# Patient Record
Sex: FEMALE | Race: WHITE | NOT HISPANIC OR LATINO | URBAN - METROPOLITAN AREA
[De-identification: names, ages, dates, MRNs, and addresses within clinical notes are randomized per-mention and may not be internally consistent; named-entity substitution may affect disease eponyms.]

---

## 2020-03-10 ENCOUNTER — INPATIENT (INPATIENT)
Facility: HOSPITAL | Age: 70
LOS: 0 days | Discharge: HOME | DRG: 914 | End: 2020-03-10
Attending: INTERNAL MEDICINE | Admitting: INTERNAL MEDICINE
Payer: COMMERCIAL

## 2020-03-10 VITALS
SYSTOLIC BLOOD PRESSURE: 137 MMHG | OXYGEN SATURATION: 97 % | DIASTOLIC BLOOD PRESSURE: 87 MMHG | WEIGHT: 110.01 LBS | HEART RATE: 80 BPM | RESPIRATION RATE: 18 BRPM | HEIGHT: 65 IN | TEMPERATURE: 99 F

## 2020-03-10 VITALS
DIASTOLIC BLOOD PRESSURE: 87 MMHG | OXYGEN SATURATION: 100 % | SYSTOLIC BLOOD PRESSURE: 161 MMHG | HEART RATE: 76 BPM | TEMPERATURE: 99 F | RESPIRATION RATE: 18 BRPM

## 2020-03-10 DIAGNOSIS — Z90.710 ACQUIRED ABSENCE OF BOTH CERVIX AND UTERUS: Chronic | ICD-10-CM

## 2020-03-10 DIAGNOSIS — Z69.81 ENCOUNTER FOR MENTAL HEALTH SERVICES FOR VICTIM OF OTHER ABUSE: ICD-10-CM

## 2020-03-10 DIAGNOSIS — S09.90XA UNSPECIFIED INJURY OF HEAD, INITIAL ENCOUNTER: ICD-10-CM

## 2020-03-10 LAB
ALBUMIN SERPL ELPH-MCNC: 4.8 G/DL — SIGNIFICANT CHANGE UP (ref 3.3–5)
ALP SERPL-CCNC: 68 U/L — SIGNIFICANT CHANGE UP (ref 40–120)
ALT FLD-CCNC: 20 U/L — SIGNIFICANT CHANGE UP (ref 10–45)
ANION GAP SERPL CALC-SCNC: 14 MMOL/L — SIGNIFICANT CHANGE UP (ref 5–17)
APAP SERPL-MCNC: <15 UG/ML — SIGNIFICANT CHANGE UP (ref 10–30)
APPEARANCE UR: CLEAR — SIGNIFICANT CHANGE UP
AST SERPL-CCNC: 22 U/L — SIGNIFICANT CHANGE UP (ref 10–40)
BASOPHILS # BLD AUTO: 0.03 K/UL — SIGNIFICANT CHANGE UP (ref 0–0.2)
BASOPHILS NFR BLD AUTO: 0.3 % — SIGNIFICANT CHANGE UP (ref 0–2)
BILIRUB SERPL-MCNC: 1.4 MG/DL — HIGH (ref 0.2–1.2)
BILIRUB UR-MCNC: NEGATIVE — SIGNIFICANT CHANGE UP
BUN SERPL-MCNC: 17 MG/DL — SIGNIFICANT CHANGE UP (ref 7–23)
CALCIUM SERPL-MCNC: 9.7 MG/DL — SIGNIFICANT CHANGE UP (ref 8.4–10.5)
CHLORIDE SERPL-SCNC: 102 MMOL/L — SIGNIFICANT CHANGE UP (ref 96–108)
CO2 SERPL-SCNC: 23 MMOL/L — SIGNIFICANT CHANGE UP (ref 22–31)
COLOR SPEC: SIGNIFICANT CHANGE UP
CREAT SERPL-MCNC: 0.72 MG/DL — SIGNIFICANT CHANGE UP (ref 0.5–1.3)
DIFF PNL FLD: NEGATIVE — SIGNIFICANT CHANGE UP
EOSINOPHIL # BLD AUTO: 0.01 K/UL — SIGNIFICANT CHANGE UP (ref 0–0.5)
EOSINOPHIL NFR BLD AUTO: 0.1 % — SIGNIFICANT CHANGE UP (ref 0–6)
ETHANOL SERPL-MCNC: SIGNIFICANT CHANGE UP MG/DL (ref 0–10)
GLUCOSE SERPL-MCNC: 117 MG/DL — HIGH (ref 70–99)
GLUCOSE UR QL: NEGATIVE — SIGNIFICANT CHANGE UP
HCT VFR BLD CALC: 39.3 % — SIGNIFICANT CHANGE UP (ref 34.5–45)
HGB BLD-MCNC: 12.9 G/DL — SIGNIFICANT CHANGE UP (ref 11.5–15.5)
IMM GRANULOCYTES NFR BLD AUTO: 0.2 % — SIGNIFICANT CHANGE UP (ref 0–1.5)
KETONES UR-MCNC: ABNORMAL
LEUKOCYTE ESTERASE UR-ACNC: NEGATIVE — SIGNIFICANT CHANGE UP
LYMPHOCYTES # BLD AUTO: 1.37 K/UL — SIGNIFICANT CHANGE UP (ref 1–3.3)
LYMPHOCYTES # BLD AUTO: 12 % — LOW (ref 13–44)
MCHC RBC-ENTMCNC: 30.4 PG — SIGNIFICANT CHANGE UP (ref 27–34)
MCHC RBC-ENTMCNC: 32.8 GM/DL — SIGNIFICANT CHANGE UP (ref 32–36)
MCV RBC AUTO: 92.7 FL — SIGNIFICANT CHANGE UP (ref 80–100)
MONOCYTES # BLD AUTO: 0.64 K/UL — SIGNIFICANT CHANGE UP (ref 0–0.9)
MONOCYTES NFR BLD AUTO: 5.6 % — SIGNIFICANT CHANGE UP (ref 2–14)
NEUTROPHILS # BLD AUTO: 9.37 K/UL — HIGH (ref 1.8–7.4)
NEUTROPHILS NFR BLD AUTO: 81.8 % — HIGH (ref 43–77)
NITRITE UR-MCNC: NEGATIVE — SIGNIFICANT CHANGE UP
NRBC # BLD: 0 /100 WBCS — SIGNIFICANT CHANGE UP (ref 0–0)
PCP SPEC-MCNC: SIGNIFICANT CHANGE UP
PH UR: 7.5 — SIGNIFICANT CHANGE UP (ref 5–8)
PLATELET # BLD AUTO: 305 K/UL — SIGNIFICANT CHANGE UP (ref 150–400)
POTASSIUM SERPL-MCNC: 4.1 MMOL/L — SIGNIFICANT CHANGE UP (ref 3.5–5.3)
POTASSIUM SERPL-SCNC: 4.1 MMOL/L — SIGNIFICANT CHANGE UP (ref 3.5–5.3)
PROT SERPL-MCNC: 7.6 G/DL — SIGNIFICANT CHANGE UP (ref 6–8.3)
PROT UR-MCNC: ABNORMAL
RBC # BLD: 4.24 M/UL — SIGNIFICANT CHANGE UP (ref 3.8–5.2)
RBC # FLD: 13.2 % — SIGNIFICANT CHANGE UP (ref 10.3–14.5)
SALICYLATES SERPL-MCNC: <2 MG/DL — LOW (ref 15–30)
SODIUM SERPL-SCNC: 139 MMOL/L — SIGNIFICANT CHANGE UP (ref 135–145)
SP GR SPEC: 1.02 — SIGNIFICANT CHANGE UP (ref 1.01–1.02)
UROBILINOGEN FLD QL: NEGATIVE — SIGNIFICANT CHANGE UP
WBC # BLD: 11.44 K/UL — HIGH (ref 3.8–10.5)
WBC # FLD AUTO: 11.44 K/UL — HIGH (ref 3.8–10.5)

## 2020-03-10 PROCEDURE — 73140 X-RAY EXAM OF FINGER(S): CPT

## 2020-03-10 PROCEDURE — 70450 CT HEAD/BRAIN W/O DYE: CPT

## 2020-03-10 PROCEDURE — 70486 CT MAXILLOFACIAL W/O DYE: CPT | Mod: 26

## 2020-03-10 PROCEDURE — 36415 COLL VENOUS BLD VENIPUNCTURE: CPT

## 2020-03-10 PROCEDURE — 99285 EMERGENCY DEPT VISIT HI MDM: CPT | Mod: 25

## 2020-03-10 PROCEDURE — 81001 URINALYSIS AUTO W/SCOPE: CPT

## 2020-03-10 PROCEDURE — 90471 IMMUNIZATION ADMIN: CPT

## 2020-03-10 PROCEDURE — 99223 1ST HOSP IP/OBS HIGH 75: CPT

## 2020-03-10 PROCEDURE — 76377 3D RENDER W/INTRP POSTPROCES: CPT

## 2020-03-10 PROCEDURE — 80053 COMPREHEN METABOLIC PANEL: CPT

## 2020-03-10 PROCEDURE — 12011 RPR F/E/E/N/L/M 2.5 CM/<: CPT

## 2020-03-10 PROCEDURE — 85027 COMPLETE CBC AUTOMATED: CPT

## 2020-03-10 PROCEDURE — 70486 CT MAXILLOFACIAL W/O DYE: CPT

## 2020-03-10 PROCEDURE — 70450 CT HEAD/BRAIN W/O DYE: CPT | Mod: 26

## 2020-03-10 PROCEDURE — 73140 X-RAY EXAM OF FINGER(S): CPT | Mod: 26,LT

## 2020-03-10 PROCEDURE — 76377 3D RENDER W/INTRP POSTPROCES: CPT | Mod: 26

## 2020-03-10 PROCEDURE — 90715 TDAP VACCINE 7 YRS/> IM: CPT

## 2020-03-10 PROCEDURE — 80307 DRUG TEST PRSMV CHEM ANLYZR: CPT

## 2020-03-10 RX ORDER — TETANUS TOXOID, REDUCED DIPHTHERIA TOXOID AND ACELLULAR PERTUSSIS VACCINE, ADSORBED 5; 2.5; 8; 8; 2.5 [IU]/.5ML; [IU]/.5ML; UG/.5ML; UG/.5ML; UG/.5ML
0.5 SUSPENSION INTRAMUSCULAR ONCE
Refills: 0 | Status: COMPLETED | OUTPATIENT
Start: 2020-03-10 | End: 2020-03-10

## 2020-03-10 RX ADMIN — TETANUS TOXOID, REDUCED DIPHTHERIA TOXOID AND ACELLULAR PERTUSSIS VACCINE, ADSORBED 0.5 MILLILITER(S): 5; 2.5; 8; 8; 2.5 SUSPENSION INTRAMUSCULAR at 18:20
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
## 2020-03-10 NOTE — ED PROVIDER NOTE - EYES NEGATIVE STATEMENT, MLM
Recommendations (Free Text): Recommended a zyrtec 10 mg over the counter every day if this is a bothersome problem for him.  There is no cure. Recommendation Preamble: The following details were discussed: Detail Level: Zone no discharge, no irritation, no pain, no redness, and no visual changes.

## 2020-03-10 NOTE — DISCHARGE NOTE PROVIDER - NSDCCPCAREPLAN_GEN_ALL_CORE_FT
PRINCIPAL DISCHARGE DIAGNOSIS  Diagnosis: Closed head injury, initial encounter  Assessment and Plan of Treatment:       SECONDARY DISCHARGE DIAGNOSES  Diagnosis: Facial laceration, initial encounter  Assessment and Plan of Treatment:
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
## 2020-03-10 NOTE — H&P ADULT - PROBLEM SELECTOR PLAN 1
head CT not reported to demonstrate acute hemorrhage  neuro examination does not demonstrate paralysis and patient is alert and oriented to name, location and time

## 2020-03-10 NOTE — H&P ADULT - ATTENDING COMMENTS
Patient assigned to me by night hospitalist in charge for management and care for patient for this evening only. Care to be resumed by day hospitalist at 08:00 in the morning and thereafter.     Eric Fuller MD  Department of Hospital Medicine  pager: [phone] (available from 20:00 to 08:00)

## 2020-03-10 NOTE — ED PROCEDURE NOTE - PROCEDURE ADDITIONAL DETAILS
2 deep dermal sutures with 4-0 vicryl     running skin suture with 6-0 prolene performed. 1 simple interrupted skin suture at most medial aspect of wound 6-0 prolene.
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
## 2020-03-10 NOTE — ED PROVIDER NOTE - ATTENDING CONTRIBUTION TO CARE
Attending MD Landry:  I personally have seen and examined this patient.  Resident note reviewed and agree on plan of care and except where noted.  See HPI, PE, and MDM for details.

## 2020-03-10 NOTE — H&P ADULT - NSHPREVIEWOFSYSTEMS_GEN_ALL_CORE
CONSTITUTIONAL: No fever, no chills  EYES: No eye pain, no acute blindness  Mouth: no pain in mouth, no cuts  RESPIRATORY: No cough, No sob  CARDIOVASCULAR: No CP, no palpitations  GASTROINTESTINAL: no abdominal pain, no n/v/d  GENITOURINARY: No dysuria, no hematuria  Heme: No easy bruising, no swelling appreciated in neck  NEUROLOGICAL: No seizure, No paralysis  SKIN: No itching, wound over right eye which is now sutured  MUSCULOSKELETAL: mild joint pain in b/l hand, able to make fist with right hand
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
## 2020-03-10 NOTE — ED ADULT NURSE REASSESSMENT NOTE - NS ED NURSE REASSESS COMMENT FT1
Report received from ARSENIO Quinn. Patient placed on 1:1 constant observation for patient safety per MD orders with staff at bedside. Security was called - All belongings removed. Patient wanded by security. 1:1 in place and directly observed at all times. Patient currently comfortable and safe. Comfort and safety provided.
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
## 2020-03-10 NOTE — ED PROCEDURE NOTE - ATTENDING CONTRIBUTION TO CARE
Attending MD Landry: Risks, benefit and alternatives of procedure explained to patient and patient demonstrated verbal understanding and consent.  I was present during the key portions of the procedure.

## 2020-03-10 NOTE — ED PROVIDER NOTE - PROGRESS NOTE DETAILS
Santi: Patient with history of alzheimer's, not oriented and attempted to walk out of ED.  Patient placed on 1:1 for elopement risk Attending MD Landry: cannot establish safe dispo home for patient, requires admission for safety evaluation.

## 2020-03-10 NOTE — DISCHARGE NOTE PROVIDER - CARE PROVIDER_API CALL
Ifeanyi Johnston)  Internal Medicine  [address] [facility], [address]  [place] [address]  Phone: [phone]  Fax: [phone]  Follow Up Time:
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
## 2020-03-10 NOTE — DISCHARGE NOTE NURSING/CASE MANAGEMENT/SOCIAL WORK - PATIENT PORTAL LINK FT
You can access the FollowMyHealth Patient Portal offered by [facility] by registering at the following website: [email or web address]. By joining [facility]’s FollowMyHealth portal, you will also be able to view your health information using other applications (apps) compatible with our system.

## 2020-03-10 NOTE — ED PROVIDER NOTE - PHYSICAL EXAMINATION
Attending MD Landry:    Gen: awake and alert, oriented x 3, GCS 15   Neck: supple, no swelling, trachea midline, no midline spinal ttp. ~2cm partial thickness laceration to right superior orbital ridge, no bony facial ttp, EOMI b/l, PERRL b/l  CV: heart with reg rhythm, no obvious murmur appreciated   Resp: CTAB, breathing comfortably  Abd: soft, NT, ND  Extremities: extremities warm to the touch, no peripheral edema   Msk: no extremity deformities. +ttp about left 4th dorsal DIP, full extension and flexion of the digit, sensation intact throughout digit.   Pysch: appropriate affect    Neuro: moves all extremities spontaneously, no gross motor or sensory deficits
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
## 2020-03-11 ENCOUNTER — INPATIENT (INPATIENT)
Facility: HOSPITAL | Age: 70
LOS: 4 days | Discharge: HOME | DRG: 884 | End: 2020-03-16
Attending: INTERNAL MEDICINE | Admitting: INTERNAL MEDICINE
Payer: COMMERCIAL

## 2020-03-11 VITALS
TEMPERATURE: 98 F | DIASTOLIC BLOOD PRESSURE: 87 MMHG | RESPIRATION RATE: 18 BRPM | OXYGEN SATURATION: 98 % | HEART RATE: 88 BPM | SYSTOLIC BLOOD PRESSURE: 151 MMHG

## 2020-03-11 DIAGNOSIS — R41.0 DISORIENTATION, UNSPECIFIED: ICD-10-CM

## 2020-03-11 DIAGNOSIS — S01.81XA LACERATION WITHOUT FOREIGN BODY OF OTHER PART OF HEAD, INITIAL ENCOUNTER: ICD-10-CM

## 2020-03-11 DIAGNOSIS — Z02.9 ENCOUNTER FOR ADMINISTRATIVE EXAMINATIONS, UNSPECIFIED: ICD-10-CM

## 2020-03-11 DIAGNOSIS — S09.90XA UNSPECIFIED INJURY OF HEAD, INITIAL ENCOUNTER: ICD-10-CM

## 2020-03-11 DIAGNOSIS — F03.91 UNSPECIFIED DEMENTIA WITH BEHAVIORAL DISTURBANCE: ICD-10-CM

## 2020-03-11 DIAGNOSIS — G30.0 ALZHEIMER'S DISEASE WITH EARLY ONSET: ICD-10-CM

## 2020-03-11 DIAGNOSIS — Z90.710 ACQUIRED ABSENCE OF BOTH CERVIX AND UTERUS: Chronic | ICD-10-CM

## 2020-03-11 LAB
ALBUMIN SERPL ELPH-MCNC: 3.9 G/DL — SIGNIFICANT CHANGE UP (ref 3.3–5)
ALBUMIN SERPL ELPH-MCNC: 4.3 G/DL — SIGNIFICANT CHANGE UP (ref 3.3–5)
ALP SERPL-CCNC: 51 U/L — SIGNIFICANT CHANGE UP (ref 40–120)
ALP SERPL-CCNC: 56 U/L — SIGNIFICANT CHANGE UP (ref 40–120)
ALT FLD-CCNC: 15 U/L — SIGNIFICANT CHANGE UP (ref 10–45)
ALT FLD-CCNC: 15 U/L — SIGNIFICANT CHANGE UP (ref 10–45)
ANION GAP SERPL CALC-SCNC: 12 MMOL/L — SIGNIFICANT CHANGE UP (ref 5–17)
ANION GAP SERPL CALC-SCNC: 14 MMOL/L — SIGNIFICANT CHANGE UP (ref 5–17)
AST SERPL-CCNC: 23 U/L — SIGNIFICANT CHANGE UP (ref 10–40)
AST SERPL-CCNC: 28 U/L — SIGNIFICANT CHANGE UP (ref 10–40)
BASOPHILS # BLD AUTO: 0.02 K/UL — SIGNIFICANT CHANGE UP (ref 0–0.2)
BASOPHILS NFR BLD AUTO: 0.2 % — SIGNIFICANT CHANGE UP (ref 0–2)
BILIRUB DIRECT SERPL-MCNC: 0.3 MG/DL — HIGH (ref 0–0.2)
BILIRUB INDIRECT FLD-MCNC: 1.9 MG/DL — HIGH (ref 0.2–1)
BILIRUB SERPL-MCNC: 2 MG/DL — HIGH (ref 0.2–1.2)
BILIRUB SERPL-MCNC: 2.2 MG/DL — HIGH (ref 0.2–1.2)
BUN SERPL-MCNC: 12 MG/DL — SIGNIFICANT CHANGE UP (ref 7–23)
BUN SERPL-MCNC: 13 MG/DL — SIGNIFICANT CHANGE UP (ref 7–23)
CALCIUM SERPL-MCNC: 9.1 MG/DL — SIGNIFICANT CHANGE UP (ref 8.4–10.5)
CALCIUM SERPL-MCNC: 9.5 MG/DL — SIGNIFICANT CHANGE UP (ref 8.4–10.5)
CHLORIDE SERPL-SCNC: 101 MMOL/L — SIGNIFICANT CHANGE UP (ref 96–108)
CHLORIDE SERPL-SCNC: 101 MMOL/L — SIGNIFICANT CHANGE UP (ref 96–108)
CO2 SERPL-SCNC: 25 MMOL/L — SIGNIFICANT CHANGE UP (ref 22–31)
CO2 SERPL-SCNC: 25 MMOL/L — SIGNIFICANT CHANGE UP (ref 22–31)
CREAT SERPL-MCNC: 0.67 MG/DL — SIGNIFICANT CHANGE UP (ref 0.5–1.3)
CREAT SERPL-MCNC: 0.74 MG/DL — SIGNIFICANT CHANGE UP (ref 0.5–1.3)
EOSINOPHIL # BLD AUTO: 0.01 K/UL — SIGNIFICANT CHANGE UP (ref 0–0.5)
EOSINOPHIL NFR BLD AUTO: 0.1 % — SIGNIFICANT CHANGE UP (ref 0–6)
GLUCOSE SERPL-MCNC: 115 MG/DL — HIGH (ref 70–99)
GLUCOSE SERPL-MCNC: 121 MG/DL — HIGH (ref 70–99)
HCT VFR BLD CALC: 34.1 % — LOW (ref 34.5–45)
HGB BLD-MCNC: 11.4 G/DL — LOW (ref 11.5–15.5)
IMM GRANULOCYTES NFR BLD AUTO: 0.4 % — SIGNIFICANT CHANGE UP (ref 0–1.5)
LYMPHOCYTES # BLD AUTO: 0.91 K/UL — LOW (ref 1–3.3)
LYMPHOCYTES # BLD AUTO: 9.5 % — LOW (ref 13–44)
MAGNESIUM SERPL-MCNC: 2.2 MG/DL — SIGNIFICANT CHANGE UP (ref 1.6–2.6)
MCHC RBC-ENTMCNC: 30.6 PG — SIGNIFICANT CHANGE UP (ref 27–34)
MCHC RBC-ENTMCNC: 33.4 GM/DL — SIGNIFICANT CHANGE UP (ref 32–36)
MCV RBC AUTO: 91.4 FL — SIGNIFICANT CHANGE UP (ref 80–100)
MONOCYTES # BLD AUTO: 0.77 K/UL — SIGNIFICANT CHANGE UP (ref 0–0.9)
MONOCYTES NFR BLD AUTO: 8 % — SIGNIFICANT CHANGE UP (ref 2–14)
NEUTROPHILS # BLD AUTO: 7.83 K/UL — HIGH (ref 1.8–7.4)
NEUTROPHILS NFR BLD AUTO: 81.8 % — HIGH (ref 43–77)
NRBC # BLD: 0 /100 WBCS — SIGNIFICANT CHANGE UP (ref 0–0)
PHOSPHATE SERPL-MCNC: 3.4 MG/DL — SIGNIFICANT CHANGE UP (ref 2.5–4.5)
PLATELET # BLD AUTO: 258 K/UL — SIGNIFICANT CHANGE UP (ref 150–400)
POTASSIUM SERPL-MCNC: 3.7 MMOL/L — SIGNIFICANT CHANGE UP (ref 3.5–5.3)
POTASSIUM SERPL-MCNC: 3.9 MMOL/L — SIGNIFICANT CHANGE UP (ref 3.5–5.3)
POTASSIUM SERPL-SCNC: 3.7 MMOL/L — SIGNIFICANT CHANGE UP (ref 3.5–5.3)
POTASSIUM SERPL-SCNC: 3.9 MMOL/L — SIGNIFICANT CHANGE UP (ref 3.5–5.3)
PROT SERPL-MCNC: 5.9 G/DL — LOW (ref 6–8.3)
PROT SERPL-MCNC: 6.7 G/DL — SIGNIFICANT CHANGE UP (ref 6–8.3)
RBC # BLD: 3.73 M/UL — LOW (ref 3.8–5.2)
RBC # FLD: 13 % — SIGNIFICANT CHANGE UP (ref 10.3–14.5)
SODIUM SERPL-SCNC: 138 MMOL/L — SIGNIFICANT CHANGE UP (ref 135–145)
SODIUM SERPL-SCNC: 140 MMOL/L — SIGNIFICANT CHANGE UP (ref 135–145)
WBC # BLD: 9.58 K/UL — SIGNIFICANT CHANGE UP (ref 3.8–10.5)
WBC # FLD AUTO: 9.58 K/UL — SIGNIFICANT CHANGE UP (ref 3.8–10.5)

## 2020-03-11 PROCEDURE — 99223 1ST HOSP IP/OBS HIGH 75: CPT | Mod: GC

## 2020-03-11 PROCEDURE — 71045 X-RAY EXAM CHEST 1 VIEW: CPT | Mod: 26

## 2020-03-11 PROCEDURE — 99285 EMERGENCY DEPT VISIT HI MDM: CPT

## 2020-03-11 PROCEDURE — 93010 ELECTROCARDIOGRAM REPORT: CPT

## 2020-03-11 RX ORDER — SODIUM CHLORIDE 9 MG/ML
1000 INJECTION INTRAMUSCULAR; INTRAVENOUS; SUBCUTANEOUS
Refills: 0 | Status: DISCONTINUED | OUTPATIENT
Start: 2020-03-11 | End: 2020-03-16

## 2020-03-11 RX ORDER — FOLIC ACID 0.8 MG
1 TABLET ORAL DAILY
Refills: 0 | Status: DISCONTINUED | OUTPATIENT
Start: 2020-03-11 | End: 2020-03-16

## 2020-03-11 RX ORDER — HALOPERIDOL DECANOATE 100 MG/ML
5 INJECTION INTRAMUSCULAR ONCE
Refills: 0 | Status: COMPLETED | OUTPATIENT
Start: 2020-03-11 | End: 2020-03-11

## 2020-03-11 RX ORDER — LANOLIN ALCOHOL/MO/W.PET/CERES
5 CREAM (GRAM) TOPICAL AT BEDTIME
Refills: 0 | Status: DISCONTINUED | OUTPATIENT
Start: 2020-03-11 | End: 2020-03-16

## 2020-03-11 RX ORDER — INFLUENZA VIRUS VACCINE 15; 15; 15; 15 UG/.5ML; UG/.5ML; UG/.5ML; UG/.5ML
0.5 SUSPENSION INTRAMUSCULAR ONCE
Refills: 0 | Status: COMPLETED | OUTPATIENT
Start: 2020-03-11 | End: 2020-03-11

## 2020-03-11 RX ORDER — THIAMINE MONONITRATE (VIT B1) 100 MG
100 TABLET ORAL DAILY
Refills: 0 | Status: COMPLETED | OUTPATIENT
Start: 2020-03-11 | End: 2020-03-14

## 2020-03-11 RX ADMIN — HALOPERIDOL DECANOATE 5 MILLIGRAM(S): 100 INJECTION INTRAMUSCULAR at 11:15

## 2020-03-11 RX ADMIN — SODIUM CHLORIDE 75 MILLILITER(S): 9 INJECTION INTRAMUSCULAR; INTRAVENOUS; SUBCUTANEOUS at 19:54

## 2020-03-11 RX ADMIN — Medication 100 MILLIGRAM(S): at 21:20

## 2020-03-11 RX ADMIN — Medication 2 MILLIGRAM(S): at 11:15

## 2020-03-11 RX ADMIN — Medication 1 MILLIGRAM(S): at 21:20

## 2020-03-11 RX ADMIN — Medication 1 TABLET(S): at 21:21

## 2020-03-11 NOTE — ED BEHAVIORAL HEALTH ASSESSMENT NOTE - DESCRIPTION
Pt received haldol 5mg and Ativan 2mg Dementia Lives with  in private residence Pt received haldol 5mg and Ativan 2mg    T(C): 36.5 (03-11-20 @ 15:16), Max: 37.4 (03-10-20 @ 17:10)  HR: 68 (03-11-20 @ 15:16) (68 - 88)  BP: 103/70 (03-11-20 @ 15:16) (100/60 - 161/87)  RR: 16 (03-11-20 @ 15:16) (16 - 18)  SpO2: 99% (03-11-20 @ 15:16) (97% - 100%)  Wt(kg): --
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
## 2020-03-11 NOTE — ED ADULT NURSE REASSESSMENT NOTE - NS ED NURSE REASSESS COMMENT FT1
Patient became very anxious and wanted to leave Able to redirect and encouraged patient to wait in am for sw

## 2020-03-11 NOTE — H&P ADULT - NSHPLABSRESULTS_GEN_ALL_CORE
LABS:                        11.4   9.58  )-----------( 258      ( 11 Mar 2020 12:24 )             34.1     03-11    138  |  101  |  13  ----------------------------<  115<H>  3.7   |  25  |  0.67    Ca    9.5      11 Mar 2020 12:24    TPro  6.7  /  Alb  4.3  /  TBili  2.0<H>  /  DBili  x   /  AST  23  /  ALT  15  /  AlkPhos  56  03-11      CAPILLARY BLOOD GLUCOSE            Urinalysis Basic - ( 10 Mar 2020 20:05 )    Color: Light Yellow / Appearance: Clear / S.024 / pH: x  Gluc: x / Ketone: Small  / Bili: Negative / Urobili: Negative   Blood: x / Protein: 30 mg/dL / Nitrite: Negative   Leuk Esterase: Negative / RBC: 4 /hpf / WBC 4 /HPF   Sq Epi: x / Non Sq Epi: 2 /hpf / Bacteria: Negative        RADIOLOGY & ADDITIONAL TESTS:
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
## 2020-03-11 NOTE — ED BEHAVIORAL HEALTH ASSESSMENT NOTE - ADDITIONAL DETAILS / COMMENTS
Able to spell THINK forwards and backwards, Recall 1/3 "apple", cued recall 2/3 "katelyn" for type of coin.
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
## 2020-03-11 NOTE — ED BEHAVIORAL HEALTH ASSESSMENT NOTE - DIFFERENTIAL
Dementia (recorded as Alzheimer's type in EMR)  Substance induced mood disorder less likely given negative UTOX, though report of alcohol use
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
## 2020-03-11 NOTE — ED BEHAVIORAL HEALTH ASSESSMENT NOTE - PSYCHIATRIC ISSUES AND PLAN (INCLUDE STANDING AND PRN MEDICATION)
prn Haldol 2mg q6hr prn for agitation, prn Haldol 2mg q6hr prn for agitation, melatonin 3mg QHS prn Haldol 2mg PO/IV q6hr prn for agitation if QTc<500, melatonin 3mg QHS
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
## 2020-03-11 NOTE — ED BEHAVIORAL HEALTH ASSESSMENT NOTE - SUICIDE PROTECTIVE FACTORS
Identifies reasons for living/Supportive social network of family or friends/Fear of death or the actual act of killing self/Beloved pets

## 2020-03-11 NOTE — ED PROVIDER NOTE - PHYSICAL EXAMINATION
Gen: AAOx3, non-toxic  Head: NCAT  HEENT: EOMI, oral mucosa moist, normal conjunctiva, R periorbital ecchymosis with sutured laceration  Lung: CTAB, no respiratory distress, no wheezes/rhonchi/rales B/L, speaking in full sentences  CV: RRR, no murmurs, rubs or gallops  Abd: soft, NTND, no guarding  MSK: no visible deformities  Neuro: No focal sensory or motor deficits  Skin: Warm, well perfused, no rash  Psych: normal affect.   ~Dorothy Breaux M.D. Resident
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
## 2020-03-11 NOTE — ED BEHAVIORAL HEALTH NOTE - BEHAVIORAL HEALTH NOTE
Pt received Ativan 2 mg with Haldol 5 mg IM at 1115H for increasing agitation. Pt attempted to elope several times. Pt was very irritable and agitated, refused to listen to staff regarding reasons why she has to be admitted.  Pt was then transferred. to room CC25. Pt is now resting. VS taken (see flowsheet)

## 2020-03-11 NOTE — H&P ADULT - ASSESSMENT
69 f PMH of newly dx dementia( ?alzheimers)  on Aricept , Alcohol use   aw behavioral disturbances,      #Dementia with behavioral disturbances.    psych cs appreciated   Recs:   1. Pt does not have capacity to leave AMA or participate in d/c planning.    2. C/w home psych meds; will need to f/u with daughter regarding potential use of neuroleptics.    3. CIWA with PRN Ativan.  Thiamine/MVI/folate.    4. PRN: Haldol 2mg PO/IV q6h PRN agitation, monitor EKG fo qtc<500    [facility]  [phone]
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
## 2020-03-11 NOTE — ED PROVIDER NOTE - NS ED ROS FT
GENERAL: No fever or chills, EYES: no change in vision, HEENT: no trouble swallowing or speaking, CARDIAC: no chest pain, PULMONARY: no cough or SOB, GI: no abdominal pain, no nausea, no vomiting, no diarrhea or constipation, : No changes in urination, SKIN: no rashes, NEURO: no headache,  MSK: No joint pain ~Dorothy Breaux M.D. Resident
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
## 2020-03-12 LAB
ANION GAP SERPL CALC-SCNC: 11 MMOL/L — SIGNIFICANT CHANGE UP (ref 5–17)
APPEARANCE UR: CLEAR — SIGNIFICANT CHANGE UP
BACTERIA # UR AUTO: ABNORMAL
BASOPHILS # BLD AUTO: 0.02 K/UL — SIGNIFICANT CHANGE UP (ref 0–0.2)
BASOPHILS NFR BLD AUTO: 0.3 % — SIGNIFICANT CHANGE UP (ref 0–2)
BILIRUB UR-MCNC: NEGATIVE — SIGNIFICANT CHANGE UP
BUN SERPL-MCNC: 12 MG/DL — SIGNIFICANT CHANGE UP (ref 7–23)
CALCIUM SERPL-MCNC: 8.6 MG/DL — SIGNIFICANT CHANGE UP (ref 8.4–10.5)
CHLORIDE SERPL-SCNC: 105 MMOL/L — SIGNIFICANT CHANGE UP (ref 96–108)
CO2 SERPL-SCNC: 24 MMOL/L — SIGNIFICANT CHANGE UP (ref 22–31)
COLOR SPEC: YELLOW — SIGNIFICANT CHANGE UP
CREAT SERPL-MCNC: 0.71 MG/DL — SIGNIFICANT CHANGE UP (ref 0.5–1.3)
DIFF PNL FLD: NEGATIVE — SIGNIFICANT CHANGE UP
EOSINOPHIL # BLD AUTO: 0.08 K/UL — SIGNIFICANT CHANGE UP (ref 0–0.5)
EOSINOPHIL NFR BLD AUTO: 1.3 % — SIGNIFICANT CHANGE UP (ref 0–6)
EPI CELLS # UR: 2 /HPF — SIGNIFICANT CHANGE UP
GLUCOSE SERPL-MCNC: 90 MG/DL — SIGNIFICANT CHANGE UP (ref 70–99)
GLUCOSE UR QL: NEGATIVE — SIGNIFICANT CHANGE UP
HCT VFR BLD CALC: 34.4 % — LOW (ref 34.5–45)
HCV AB S/CO SERPL IA: 0.18 S/CO — SIGNIFICANT CHANGE UP (ref 0–0.99)
HCV AB SERPL-IMP: SIGNIFICANT CHANGE UP
HGB BLD-MCNC: 10.9 G/DL — LOW (ref 11.5–15.5)
HYALINE CASTS # UR AUTO: 4 /LPF — HIGH (ref 0–2)
IMM GRANULOCYTES NFR BLD AUTO: 0.3 % — SIGNIFICANT CHANGE UP (ref 0–1.5)
KETONES UR-MCNC: SIGNIFICANT CHANGE UP
LEUKOCYTE ESTERASE UR-ACNC: ABNORMAL
LYMPHOCYTES # BLD AUTO: 1.92 K/UL — SIGNIFICANT CHANGE UP (ref 1–3.3)
LYMPHOCYTES # BLD AUTO: 30.5 % — SIGNIFICANT CHANGE UP (ref 13–44)
MAGNESIUM SERPL-MCNC: 2.2 MG/DL — SIGNIFICANT CHANGE UP (ref 1.6–2.6)
MCHC RBC-ENTMCNC: 29.7 PG — SIGNIFICANT CHANGE UP (ref 27–34)
MCHC RBC-ENTMCNC: 31.7 GM/DL — LOW (ref 32–36)
MCV RBC AUTO: 93.7 FL — SIGNIFICANT CHANGE UP (ref 80–100)
MONOCYTES # BLD AUTO: 0.66 K/UL — SIGNIFICANT CHANGE UP (ref 0–0.9)
MONOCYTES NFR BLD AUTO: 10.5 % — SIGNIFICANT CHANGE UP (ref 2–14)
NEUTROPHILS # BLD AUTO: 3.59 K/UL — SIGNIFICANT CHANGE UP (ref 1.8–7.4)
NEUTROPHILS NFR BLD AUTO: 57.1 % — SIGNIFICANT CHANGE UP (ref 43–77)
NITRITE UR-MCNC: NEGATIVE — SIGNIFICANT CHANGE UP
NRBC # BLD: 0 /100 WBCS — SIGNIFICANT CHANGE UP (ref 0–0)
PH UR: 6 — SIGNIFICANT CHANGE UP (ref 5–8)
PHOSPHATE SERPL-MCNC: 3.8 MG/DL — SIGNIFICANT CHANGE UP (ref 2.5–4.5)
PLATELET # BLD AUTO: 243 K/UL — SIGNIFICANT CHANGE UP (ref 150–400)
POTASSIUM SERPL-MCNC: 3.8 MMOL/L — SIGNIFICANT CHANGE UP (ref 3.5–5.3)
POTASSIUM SERPL-SCNC: 3.8 MMOL/L — SIGNIFICANT CHANGE UP (ref 3.5–5.3)
PROT UR-MCNC: SIGNIFICANT CHANGE UP
RBC # BLD: 3.67 M/UL — LOW (ref 3.8–5.2)
RBC # FLD: 13 % — SIGNIFICANT CHANGE UP (ref 10.3–14.5)
RBC CASTS # UR COMP ASSIST: 1 /HPF — SIGNIFICANT CHANGE UP (ref 0–4)
SODIUM SERPL-SCNC: 140 MMOL/L — SIGNIFICANT CHANGE UP (ref 135–145)
SP GR SPEC: 1.01 — SIGNIFICANT CHANGE UP (ref 1.01–1.02)
UROBILINOGEN FLD QL: NEGATIVE — SIGNIFICANT CHANGE UP
WBC # BLD: 6.29 K/UL — SIGNIFICANT CHANGE UP (ref 3.8–10.5)
WBC # FLD AUTO: 6.29 K/UL — SIGNIFICANT CHANGE UP (ref 3.8–10.5)
WBC UR QL: 45 /HPF — HIGH (ref 0–5)

## 2020-03-12 RX ORDER — SODIUM CHLORIDE 9 MG/ML
250 INJECTION INTRAMUSCULAR; INTRAVENOUS; SUBCUTANEOUS ONCE
Refills: 0 | Status: COMPLETED | OUTPATIENT
Start: 2020-03-12 | End: 2020-03-12

## 2020-03-12 RX ADMIN — Medication 1 MILLIGRAM(S): at 11:05

## 2020-03-12 RX ADMIN — SODIUM CHLORIDE 500 MILLILITER(S): 9 INJECTION INTRAMUSCULAR; INTRAVENOUS; SUBCUTANEOUS at 06:27

## 2020-03-12 RX ADMIN — Medication 5 MILLIGRAM(S): at 22:01

## 2020-03-12 RX ADMIN — Medication 1 TABLET(S): at 11:05

## 2020-03-12 RX ADMIN — Medication 100 MILLIGRAM(S): at 11:05

## 2020-03-12 RX ADMIN — Medication 1 MILLIGRAM(S): at 18:30

## 2020-03-12 NOTE — PROGRESS NOTE ADULT - SUBJECTIVE AND OBJECTIVE BOX
Patient is a 69y old  Female who presents with a chief complaint of     INTERVAL HPI/OVERNIGHT EVENTS: noted, feels well  calm and cooperative      Vital Signs Last 24 Hrs  T(C): 36.8 (12 Mar 2020 12:02), Max: 37.3 (11 Mar 2020 19:37)  T(F): 98.2 (12 Mar 2020 12:02), Max: 99.2 (11 Mar 2020 19:37)  HR: 63 (12 Mar 2020 12:02) (63 - 78)  BP: 108/65 (12 Mar 2020 12:02) (91/54 - 109/65)  BP(mean): --  RR: 18 (12 Mar 2020 12:02) (16 - 18)  SpO2: 99% (12 Mar 2020 12:02) (96% - 99%)    folic acid 1 milliGRAM(s) Oral daily  influenza   Vaccine 0.5 milliLiter(s) IntraMuscular once  LORazepam     Tablet 1 milliGRAM(s) Oral every 2 hours PRN  melatonin 5 milliGRAM(s) Oral at bedtime  multivitamin 1 Tablet(s) Oral daily  sodium chloride 0.9%. 1000 milliLiter(s) IV Continuous <Continuous>  thiamine 100 milliGRAM(s) Oral daily      PHYSICAL EXAM:  GENERAL: NAD,   EYES: conjunctiva and sclera clear, rt facial echymosis  ENMT: Moist mucous membranes  NECK: Supple, No JVD, Normal thyroid  NERVOUS SYSTEM:  Alert & Oriented X,   CHEST/LUNG: Clear to auscultation bilaterally; No rales, rhonchi, wheezing, or rubs  HEART: Regular rate and rhythm; No murmurs, rubs, or gallops  ABDOMEN: Soft, Nontender, Nondistended; Bowel sounds present  EXTREMITIES:  2+ Peripheral Pulses, No clubbing, cyanosis, or edema  LYMPH: No lymphadenopathy noted  SKIN: No rashes or lesions    Consultant(s) Notes Reviewed:  [x ] YES  [ ] NO  Care Discussed with Consultants/Other Providers [ x] YES  [ ] NO    LABS:                        10.9   6.29  )-----------( 243      ( 12 Mar 2020 05:44 )             34.4     03-12    140  |  105  |  12  ----------------------------<  90  3.8   |  24  |  0.71    Ca    8.6      12 Mar 2020 05:44  Phos  3.8     03-12  Mg     2.2     03-12    TPro  5.9<L>  /  Alb  3.9  /  TBili  2.2<H>  /  DBili  0.3<H>  /  AST  28  /  ALT  15  /  AlkPhos  51  03-11      Urinalysis Basic - ( 12 Mar 2020 05:44 )    Color: Yellow / Appearance: Clear / S.015 / pH: x  Gluc: x / Ketone: Trace  / Bili: Negative / Urobili: Negative   Blood: x / Protein: Trace / Nitrite: Negative   Leuk Esterase: Large / RBC: 1 /hpf / WBC 45 /HPF   Sq Epi: x / Non Sq Epi: 2 /hpf / Bacteria: Few      CAPILLARY BLOOD GLUCOSE            Urinalysis Basic - ( 12 Mar 2020 05:44 )    Color: Yellow / Appearance: Clear / S.015 / pH: x  Gluc: x / Ketone: Trace  / Bili: Negative / Urobili: Negative   Blood: x / Protein: Trace / Nitrite: Negative   Leuk Esterase: Large / RBC: 1 /hpf / WBC 45 /HPF   Sq Epi: x / Non Sq Epi: 2 /hpf / Bacteria: Few          RADIOLOGY & ADDITIONAL TESTS:    Imaging Personally Reviewed:  [x ] YES  [ ] NO

## 2020-03-12 NOTE — PROVIDER CONTACT NOTE (OTHER) - REASON
pt very anxious at this time - states they will walk out if they are not discharged - CIWA score an 8

## 2020-03-12 NOTE — PROGRESS NOTE ADULT - ASSESSMENT
69 f PMH of newly dx dementia( ?alzheimers)  on Aricept , Alcohol use   aw behavioral disturbances,      #Dementia with behavioral disturbances.    psych cs appreciated , fu recs from today  Recs:   1. Pt does not have capacity to leave AMA or participate in d/c planning.    2. C/w home psych meds; will need to f/u with daughter regarding potential use of neuroleptics.    3. CIWA with PRN Ativan.  Thiamine/MVI/folate.    4. PRN: Haldol 2mg PO/IV q6h PRN agitation, monitor EKG fo qtc<500    [facility]

## 2020-03-12 NOTE — PROVIDER CONTACT NOTE (OTHER) - ASSESSMENT
Pt started on low risk CIWA protocol q 4 hrs. PRN ativan order unclear stating 1 mg ativan oral every two hours. PRN for CIWA score increase by two points and "a total score of 7 or less"

## 2020-03-13 ENCOUNTER — TRANSCRIPTION ENCOUNTER (OUTPATIENT)
Age: 70
End: 2020-03-13

## 2020-03-13 LAB
ANION GAP SERPL CALC-SCNC: 12 MMOL/L — SIGNIFICANT CHANGE UP (ref 5–17)
BUN SERPL-MCNC: 12 MG/DL — SIGNIFICANT CHANGE UP (ref 7–23)
CALCIUM SERPL-MCNC: 9.1 MG/DL — SIGNIFICANT CHANGE UP (ref 8.4–10.5)
CHLORIDE SERPL-SCNC: 104 MMOL/L — SIGNIFICANT CHANGE UP (ref 96–108)
CO2 SERPL-SCNC: 25 MMOL/L — SIGNIFICANT CHANGE UP (ref 22–31)
CREAT SERPL-MCNC: 0.74 MG/DL — SIGNIFICANT CHANGE UP (ref 0.5–1.3)
GLUCOSE SERPL-MCNC: 96 MG/DL — SIGNIFICANT CHANGE UP (ref 70–99)
HCT VFR BLD CALC: 33.4 % — LOW (ref 34.5–45)
HGB BLD-MCNC: 10.7 G/DL — LOW (ref 11.5–15.5)
MCHC RBC-ENTMCNC: 29.7 PG — SIGNIFICANT CHANGE UP (ref 27–34)
MCHC RBC-ENTMCNC: 32 GM/DL — SIGNIFICANT CHANGE UP (ref 32–36)
MCV RBC AUTO: 92.8 FL — SIGNIFICANT CHANGE UP (ref 80–100)
NRBC # BLD: 0 /100 WBCS — SIGNIFICANT CHANGE UP (ref 0–0)
PLATELET # BLD AUTO: 242 K/UL — SIGNIFICANT CHANGE UP (ref 150–400)
POTASSIUM SERPL-MCNC: 3.7 MMOL/L — SIGNIFICANT CHANGE UP (ref 3.5–5.3)
POTASSIUM SERPL-SCNC: 3.7 MMOL/L — SIGNIFICANT CHANGE UP (ref 3.5–5.3)
RBC # BLD: 3.6 M/UL — LOW (ref 3.8–5.2)
RBC # FLD: 12.7 % — SIGNIFICANT CHANGE UP (ref 10.3–14.5)
SODIUM SERPL-SCNC: 141 MMOL/L — SIGNIFICANT CHANGE UP (ref 135–145)
WBC # BLD: 5.5 K/UL — SIGNIFICANT CHANGE UP (ref 3.8–10.5)
WBC # FLD AUTO: 5.5 K/UL — SIGNIFICANT CHANGE UP (ref 3.8–10.5)

## 2020-03-13 PROCEDURE — 99231 SBSQ HOSP IP/OBS SF/LOW 25: CPT | Mod: GC

## 2020-03-13 RX ORDER — DONEPEZIL HYDROCHLORIDE 10 MG/1
5 TABLET, FILM COATED ORAL AT BEDTIME
Refills: 0 | Status: DISCONTINUED | OUTPATIENT
Start: 2020-03-13 | End: 2020-03-16

## 2020-03-13 RX ADMIN — DONEPEZIL HYDROCHLORIDE 5 MILLIGRAM(S): 10 TABLET, FILM COATED ORAL at 18:06

## 2020-03-13 RX ADMIN — Medication 100 MILLIGRAM(S): at 12:16

## 2020-03-13 RX ADMIN — Medication 1 MILLIGRAM(S): at 12:16

## 2020-03-13 RX ADMIN — Medication 1 TABLET(S): at 12:16

## 2020-03-13 RX ADMIN — Medication 1 MILLIGRAM(S): at 18:09

## 2020-03-13 RX ADMIN — Medication 5 MILLIGRAM(S): at 21:58

## 2020-03-13 NOTE — DISCHARGE NOTE PROVIDER - NSFOLLOWUPCLINICS_GEN_ALL_ED_FT
See op note   [facility]  Behavioral Health  [address] [place] [address]  Phone: [phone]  Fax:   Follow Up Time:
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
## 2020-03-13 NOTE — DISCHARGE NOTE PROVIDER - NSDCCPCAREPLAN_GEN_ALL_CORE_FT
PRINCIPAL DISCHARGE DIAGNOSIS  Diagnosis: Confusion  Assessment and Plan of Treatment: Started on CIWA protocol  Seen by behavorial health  Continue aricept  Social work planning safe discharge PRINCIPAL DISCHARGE DIAGNOSIS  Diagnosis: Confusion  Assessment and Plan of Treatment: Started on CIWA protocol  Seen by behavorial health - lacks capacity to make own decisions.   Continue aricept  Social work involved for safe discharge. Family to provide safe home environment and continued care.  Follow up with your Primary MD Dr Johnston within 5 days.
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
## 2020-03-13 NOTE — DISCHARGE NOTE PROVIDER - CARE PROVIDER_API CALL
Ifeanyi Johnston)  Internal Medicine  [address], [address]  [place] [address]  Phone: [phone]  Fax: [phone]  Follow Up Time: 1 week

## 2020-03-13 NOTE — PROGRESS NOTE BEHAVIORAL HEALTH - NSBHCHARTREVIEWINVESTIGATE_PSY_A_CORE FT
< from: 12 Lead ECG (03.11.20 @ 19:29) >      Ventricular Rate 78 BPM    Atrial Rate 78 BPM    P-R Interval 174 ms    QRS Duration 72 ms    Q-T Interval 400 ms    QTC Calculation(Bezet) 456 ms    P Axis 80 degrees    R Axis 80 degrees    T Axis 65 degrees    < end of copied text >

## 2020-03-13 NOTE — PROGRESS NOTE BEHAVIORAL HEALTH - NSBHCONSULTFOLLOWAFTERCARE_PSY_A_CORE FT
[facility] clinic [phone] OP psych f/u at [facility]- [phone]  [facility] Crisis clinic- [phone]    [facility] for safe d/c planning
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
## 2020-03-13 NOTE — PROGRESS NOTE BEHAVIORAL HEALTH - NSBHCHARTREVIEWVS_PSY_A_CORE FT
Vital Signs Last 24 Hrs  T(C): 36.7 (13 Mar 2020 11:06), Max: 37.3 (12 Mar 2020 16:00)  T(F): 98 (13 Mar 2020 11:06), Max: 99.1 (12 Mar 2020 16:00)  HR: 71 (13 Mar 2020 11:06) (61 - 78)  BP: 151/81 (13 Mar 2020 11:06) (90/52 - 151/81)  BP(mean): --  RR: 18 (13 Mar 2020 11:06) (18 - 18)  SpO2: 97% (13 Mar 2020 11:06) (95% - 99%)
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
## 2020-03-13 NOTE — DISCHARGE NOTE PROVIDER - HOSPITAL COURSE
69 f PMH of newly dx dementia, on Aricept ,  history of Alcohol use, presents with     ,AMS likely 2/2 EtOH abuse. Patient was started on CIWA protocol and behavorial health was consulted.    Determined by psych that patient does not have capacity to participate in discharge planning or to leave AMA.    Continue aricept for dementia. 69 f PMH of newly dx dementia, on Aricept ,  history of Alcohol use, presents with     ,AMS likely 2/2 EtOH abuse. Patient was started on CIWA protocol and behavorial health was consulted.    Determined by psych that patient does not have capacity to participate in discharge planning or to leave AMA.    Continue aricept for dementia.         Required 1:1 supervision for safety. Followed by SW. Discharge home with  and family.         Stable for discharge with F/U with PMD Dr Ifeanyi Johnston within 1 week.

## 2020-03-13 NOTE — DISCHARGE NOTE PROVIDER - NSDCMRMEDTOKEN_GEN_ALL_CORE_FT
donepezil 5 mg oral tablet: 1 tab(s) orally once a day (at bedtime)  folic acid 1 mg oral tablet: 1 tab(s) orally once a day  melatonin 5 mg oral tablet: 1 tab(s) orally once a day (at bedtime)  Multiple Vitamins oral tablet: 1 tab(s) orally once a day
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
## 2020-03-13 NOTE — DISCHARGE NOTE PROVIDER - NSDCFUADDINST_GEN_ALL_CORE_FT
Follow up with Psychiatry as outpatient  with [facility]- [phone]  [facility] clinic- [phone]
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
## 2020-03-13 NOTE — PROGRESS NOTE BEHAVIORAL HEALTH - NSBHCHARTREVIEWLAB_PSY_A_CORE FT
10.7   5.50  )-----------( 242      ( 13 Mar 2020 06:40 )             33.4     03-13    141  |  104  |  12  ----------------------------<  96  3.7   |  25  |  0.74    Ca    9.1      13 Mar 2020 06:40  Phos  3.8     03-12  Mg     2.2     03-12    TPro  5.9<L>  /  Alb  3.9  /  TBili  2.2<H>  /  DBili  0.3<H>  /  AST  28  /  ALT  15  /  AlkPhos  51  03-11    Urinalysis Basic - ( 12 Mar 2020 05:44 )    Color: Yellow / Appearance: Clear / S.015 / pH: x  Gluc: x / Ketone: Trace  / Bili: Negative / Urobili: Negative   Blood: x / Protein: Trace / Nitrite: Negative   Leuk Esterase: Large / RBC: 1 /hpf / WBC 45 /HPF   Sq Epi: x / Non Sq Epi: 2 /hpf / Bacteria: Few

## 2020-03-13 NOTE — CHART NOTE - NSCHARTNOTEFT_GEN_A_CORE
PA Medicine Event Note    Called by RN stating patient agitated/anxious, attempting to leave floor. CIWA score of 8.  Patient seen at bedside: States social work told her she can "leave today."  Confirmed with Dr. Marie that patient has no capacity to leave AMA today.    Discussed with social work on call who discussed primary  [place] of above.  Per social work, it is unsafe to be discharged at this time. Social work discussed with behavorial health today  confirming patient has no capacity.   Patient A&Ox3, however unable to comprehend need for safe discharge.  IV ativan ordered; however patient agreed to take PO prn ativan for CIWA protocol.  Will continue to monitor patient and endorse to night team.    Leanna Watkins PA-C  Dept of Medicine  #[id number] [facility] Medicine Event Note    Called by RN stating patient agitated/anxious, attempting to leave floor. CIWA score of 8.  Patient seen at bedside: States social work told her she can "leave today."  Confirmed with Dr. Marie that patient has no capacity to leave AMA today.    Discussed with social work on call who discussed primary  [place] of above.  Per social work, it is unsafe to be discharged at this time. Social work discussed with behavorial health today confirming patient has no capacity.   Patient A&Ox3, however unable to comprehend need for safe discharge.  IV ativan ordered; however patient agreed to take PO prn ativan for CIWA protocol.  Will continue to monitor patient and endorse to night team.    Leanna Watkins PA-C  Dept of Medicine  #[id number]

## 2020-03-13 NOTE — PROGRESS NOTE BEHAVIORAL HEALTH - NSBHCHARTREVIEWIMAGING_PSY_A_CORE FT
< from: CT Head No Cont (03.10.20 @ 18:47) >    IMPRESSION:  Head CT: No evidence for intracranial hemorrhage, mass effect, or displaced calvarial fracture.     Maxillofacial CT: No acutemaxillofacial bone fracture.    < end of copied text >

## 2020-03-13 NOTE — PROGRESS NOTE BEHAVIORAL HEALTH - OTHER
Ecchymosis on R brow was sitting during interview unable to assess as pt declined to continue with cognitive assessment portion of interview when  called vague at times
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
## 2020-03-13 NOTE — PROGRESS NOTE BEHAVIORAL HEALTH - NSBHCONSULTMEDS_PSY_A_CORE FT
PRN: Haldol 2mg PO/IV q6hr prn for agitation if QTc<500    melatonin 3mg QHS    CIWA monitoring with sx-triggered Ativan PRN

## 2020-03-14 LAB
ANION GAP SERPL CALC-SCNC: 12 MMOL/L — SIGNIFICANT CHANGE UP (ref 5–17)
BUN SERPL-MCNC: 21 MG/DL — SIGNIFICANT CHANGE UP (ref 7–23)
CALCIUM SERPL-MCNC: 9.5 MG/DL — SIGNIFICANT CHANGE UP (ref 8.4–10.5)
CHLORIDE SERPL-SCNC: 104 MMOL/L — SIGNIFICANT CHANGE UP (ref 96–108)
CO2 SERPL-SCNC: 24 MMOL/L — SIGNIFICANT CHANGE UP (ref 22–31)
CREAT SERPL-MCNC: 0.77 MG/DL — SIGNIFICANT CHANGE UP (ref 0.5–1.3)
GLUCOSE SERPL-MCNC: 101 MG/DL — HIGH (ref 70–99)
HCT VFR BLD CALC: 36.4 % — SIGNIFICANT CHANGE UP (ref 34.5–45)
HGB BLD-MCNC: 11.9 G/DL — SIGNIFICANT CHANGE UP (ref 11.5–15.5)
MAGNESIUM SERPL-MCNC: 2.2 MG/DL — SIGNIFICANT CHANGE UP (ref 1.6–2.6)
MCHC RBC-ENTMCNC: 30 PG — SIGNIFICANT CHANGE UP (ref 27–34)
MCHC RBC-ENTMCNC: 32.7 GM/DL — SIGNIFICANT CHANGE UP (ref 32–36)
MCV RBC AUTO: 91.7 FL — SIGNIFICANT CHANGE UP (ref 80–100)
NRBC # BLD: 0 /100 WBCS — SIGNIFICANT CHANGE UP (ref 0–0)
PHOSPHATE SERPL-MCNC: 4.3 MG/DL — SIGNIFICANT CHANGE UP (ref 2.5–4.5)
PLATELET # BLD AUTO: 261 K/UL — SIGNIFICANT CHANGE UP (ref 150–400)
POTASSIUM SERPL-MCNC: 4.1 MMOL/L — SIGNIFICANT CHANGE UP (ref 3.5–5.3)
POTASSIUM SERPL-SCNC: 4.1 MMOL/L — SIGNIFICANT CHANGE UP (ref 3.5–5.3)
RBC # BLD: 3.97 M/UL — SIGNIFICANT CHANGE UP (ref 3.8–5.2)
RBC # FLD: 12.7 % — SIGNIFICANT CHANGE UP (ref 10.3–14.5)
SODIUM SERPL-SCNC: 140 MMOL/L — SIGNIFICANT CHANGE UP (ref 135–145)
WBC # BLD: 6.81 K/UL — SIGNIFICANT CHANGE UP (ref 3.8–10.5)
WBC # FLD AUTO: 6.81 K/UL — SIGNIFICANT CHANGE UP (ref 3.8–10.5)

## 2020-03-14 RX ORDER — FOLIC ACID 0.8 MG
1 TABLET ORAL
Qty: 0 | Refills: 0 | DISCHARGE
Start: 2020-03-14

## 2020-03-14 RX ORDER — LANOLIN ALCOHOL/MO/W.PET/CERES
1 CREAM (GRAM) TOPICAL
Qty: 0 | Refills: 0 | DISCHARGE
Start: 2020-03-14

## 2020-03-14 RX ORDER — DONEPEZIL HYDROCHLORIDE 10 MG/1
1 TABLET, FILM COATED ORAL
Qty: 0 | Refills: 0 | DISCHARGE
Start: 2020-03-14

## 2020-03-14 RX ADMIN — Medication 1 TABLET(S): at 12:12

## 2020-03-14 RX ADMIN — Medication 1 MILLIGRAM(S): at 16:19

## 2020-03-14 RX ADMIN — Medication 1 MILLIGRAM(S): at 12:12

## 2020-03-14 RX ADMIN — DONEPEZIL HYDROCHLORIDE 5 MILLIGRAM(S): 10 TABLET, FILM COATED ORAL at 10:39

## 2020-03-14 RX ADMIN — Medication 100 MILLIGRAM(S): at 12:12

## 2020-03-14 NOTE — CHART NOTE - NSCHARTNOTEFT_GEN_A_CORE
Pt's son Laith Monterroso  is requesting Pt  to be Opted out and requesting that his father Buddy Monterroso cannot Visit Pt in Hospital he stated " If he enters this Unit you should be calling security "   Staff aware   will c/w  Constant  observation   steven albright NP-C [id number]

## 2020-03-14 NOTE — PROGRESS NOTE ADULT - ATTENDING COMMENTS
psych deemed pt has no capacity for dc plan  dw SW at length-who spoke to dgtr and son- they were ok with pt going home now  will need to clarify safe dc plan    [facility]

## 2020-03-14 NOTE — CHART NOTE - NSCHARTNOTEFT_GEN_A_CORE
Pt wanting to go Home , stating " they told me that I could be discharged today "  Spoke with  psych  attending  DR Alvarez , Pt do not have capacity for AMA or safe discharge planning   After extensive discussions  with LISBET Codne   and DR Blanton  Pt is  not be cleared for discharge today .  Pt restless and anxious , walking around the unit ,  became very upset  with provider   when  told that she cannot be discharged ,   Code flight called  , Pt brought back to floor escorted by  security personnel and  Nursing Adm   Ativan 1 mg  IV P  x 1  stat   c/w Constant observation   fall risk , Bed alarm and chair  alarm   steven albright [id number]
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
## 2020-03-14 NOTE — PROGRESS NOTE ADULT - SUBJECTIVE AND OBJECTIVE BOX
Patient is a 69y old  Female who presents with a chief complaint of     INTERVAL HPI/OVERNIGHT EVENTS: noted, feels well  anxious to leave      Vital Signs Last 24 Hrs  T(C): 36.7 (14 Mar 2020 13:12), Max: 37.1 (14 Mar 2020 04:00)  T(F): 98.1 (14 Mar 2020 13:12), Max: 98.7 (14 Mar 2020 04:00)  HR: 88 (14 Mar 2020 13:12) (61 - 93)  BP: 122/80 (14 Mar 2020 13:12) (90/52 - 156/82)  BP(mean): --  RR: 20 (14 Mar 2020 13:12) (18 - 20)  SpO2: 98% (14 Mar 2020 13:12) (94% - 98%)    donepezil 5 milliGRAM(s) Oral at bedtime  folic acid 1 milliGRAM(s) Oral daily  influenza   Vaccine 0.5 milliLiter(s) IntraMuscular once  LORazepam     Tablet 1 milliGRAM(s) Oral every 2 hours PRN  LORazepam   Injectable 2 milliGRAM(s) IV Push once  LORazepam   Injectable 1 milliGRAM(s) IV Push once  melatonin 5 milliGRAM(s) Oral at bedtime  multivitamin 1 Tablet(s) Oral daily  sodium chloride 0.9%. 1000 milliLiter(s) IV Continuous <Continuous>      PHYSICAL EXAM:  GENERAL: NAD, facial echymosis healing  EYES: conjunctiva and sclera clear  ENMT: Moist mucous membranes  NECK: Supple, No JVD, Normal thyroid  NERVOUS SYSTEM:  Alert & Oriented X,   CHEST/LUNG: Clear to auscultation bilaterally; No rales, rhonchi, wheezing, or rubs  HEART: Regular rate and rhythm; No murmurs, rubs, or gallops  ABDOMEN: Soft, Nontender, Nondistended; Bowel sounds present  EXTREMITIES:  2+ Peripheral Pulses, No clubbing, cyanosis, or edema  LYMPH: No lymphadenopathy noted  SKIN: No rashes or lesions    Consultant(s) Notes Reviewed:  [x ] YES  [ ] NO  Care Discussed with Consultants/Other Providers [ x] YES  [ ] NO    LABS:                        11.9   6.81  )-----------( 261      ( 14 Mar 2020 06:48 )             36.4     03-14    140  |  104  |  21  ----------------------------<  101<H>  4.1   |  24  |  0.77    Ca    9.5      14 Mar 2020 06:48  Phos  4.3     03-14  Mg     2.2     03-14          CAPILLARY BLOOD GLUCOSE                  RADIOLOGY & ADDITIONAL TESTS:    Imaging Personally Reviewed:  [x ] YES  [ ] NO

## 2020-03-15 RX ADMIN — Medication 1 MILLIGRAM(S): at 11:42

## 2020-03-15 RX ADMIN — DONEPEZIL HYDROCHLORIDE 5 MILLIGRAM(S): 10 TABLET, FILM COATED ORAL at 08:25

## 2020-03-15 RX ADMIN — Medication 1 TABLET(S): at 11:42
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
## 2020-03-15 NOTE — PROGRESS NOTE ADULT - SUBJECTIVE AND OBJECTIVE BOX
Patient is a 69y old  Female who presents with a chief complaint of     INTERVAL HPI/OVERNIGHT EVENTS: noted, pt easily getting emotional today  otherwise no new events/complaints      Vital Signs Last 24 Hrs  T(C): 37 (15 Mar 2020 12:00), Max: 37.1 (14 Mar 2020 23:01)  T(F): 98.6 (15 Mar 2020 12:00), Max: 98.7 (14 Mar 2020 23:01)  HR: 87 (15 Mar 2020 12:00) (63 - 92)  BP: 131/73 (15 Mar 2020 12:00) (96/59 - 145/78)  BP(mean): --  RR: 18 (15 Mar 2020 12:00) (17 - 20)  SpO2: 95% (15 Mar 2020 12:00) (95% - 99%)    donepezil 5 milliGRAM(s) Oral at bedtime  folic acid 1 milliGRAM(s) Oral daily  influenza   Vaccine 0.5 milliLiter(s) IntraMuscular once  LORazepam     Tablet 1 milliGRAM(s) Oral every 2 hours PRN  LORazepam   Injectable 2 milliGRAM(s) IV Push once  melatonin 5 milliGRAM(s) Oral at bedtime  multivitamin 1 Tablet(s) Oral daily  sodium chloride 0.9%. 1000 milliLiter(s) IV Continuous <Continuous>      PHYSICAL EXAM:  GENERAL: NAD,   EYES: conjunctiva and sclera clear  ENMT: Moist mucous membranes  NECK: Supple, No JVD, Normal thyroid  NERVOUS SYSTEM:  Alert & Oriented X,   CHEST/LUNG: Clear to auscultation bilaterally; No rales, rhonchi, wheezing, or rubs  HEART: Regular rate and rhythm; No murmurs, rubs, or gallops  ABDOMEN: Soft, Nontender, Nondistended; Bowel sounds present  EXTREMITIES:  2+ Peripheral Pulses, No clubbing, cyanosis, or edema  LYMPH: No lymphadenopathy noted  SKIN: No rashes or lesions    Consultant(s) Notes Reviewed:  [x ] YES  [ ] NO  Care Discussed with Consultants/Other Providers [ x] YES  [ ] NO    LABS:                        11.9   6.81  )-----------( 261      ( 14 Mar 2020 06:48 )             36.4     03-14    140  |  104  |  21  ----------------------------<  101<H>  4.1   |  24  |  0.77    Ca    9.5      14 Mar 2020 06:48  Phos  4.3     03-14  Mg     2.2     03-14          CAPILLARY BLOOD GLUCOSE                  RADIOLOGY & ADDITIONAL TESTS:    Imaging Personally Reviewed:  [x ] YES  [ ] NO

## 2020-03-15 NOTE — PROGRESS NOTE ADULT - ATTENDING COMMENTS
psych deemed pt has no capacity for dc plan  dw SW at length-who spoke to dgtr and son- they were ok with pt to be dced -  dw son- who made arrangements for mom to stay in [place]  will need to clarify safe dc plan monday    [facility]

## 2020-03-16 ENCOUNTER — TRANSCRIPTION ENCOUNTER (OUTPATIENT)
Age: 70
End: 2020-03-16

## 2020-03-16 VITALS
OXYGEN SATURATION: 97 % | SYSTOLIC BLOOD PRESSURE: 130 MMHG | HEART RATE: 100 BPM | TEMPERATURE: 99 F | RESPIRATION RATE: 18 BRPM | DIASTOLIC BLOOD PRESSURE: 87 MMHG

## 2020-03-16 PROCEDURE — 84100 ASSAY OF PHOSPHORUS: CPT

## 2020-03-16 PROCEDURE — 81001 URINALYSIS AUTO W/SCOPE: CPT

## 2020-03-16 PROCEDURE — 80048 BASIC METABOLIC PNL TOTAL CA: CPT

## 2020-03-16 PROCEDURE — 85027 COMPLETE CBC AUTOMATED: CPT

## 2020-03-16 PROCEDURE — 99285 EMERGENCY DEPT VISIT HI MDM: CPT | Mod: 25

## 2020-03-16 PROCEDURE — 83735 ASSAY OF MAGNESIUM: CPT

## 2020-03-16 PROCEDURE — 80076 HEPATIC FUNCTION PANEL: CPT

## 2020-03-16 PROCEDURE — 93005 ELECTROCARDIOGRAM TRACING: CPT

## 2020-03-16 PROCEDURE — 80053 COMPREHEN METABOLIC PANEL: CPT

## 2020-03-16 PROCEDURE — 96372 THER/PROPH/DIAG INJ SC/IM: CPT

## 2020-03-16 PROCEDURE — 99238 HOSP IP/OBS DSCHRG MGMT 30/<: CPT

## 2020-03-16 PROCEDURE — 71045 X-RAY EXAM CHEST 1 VIEW: CPT

## 2020-03-16 PROCEDURE — 86803 HEPATITIS C AB TEST: CPT

## 2020-03-16 RX ADMIN — Medication 1 MILLIGRAM(S): at 12:11

## 2020-03-16 RX ADMIN — Medication 1 TABLET(S): at 12:11

## 2020-03-16 NOTE — DISCHARGE NOTE NURSING/CASE MANAGEMENT/SOCIAL WORK - PATIENT PORTAL LINK FT
You can access the FollowMyHealth Patient Portal offered by [facility] by registering at the following website: [email or web address]. By joining [facility]’s FollowMyHealth portal, you will also be able to view your health information using other applications (apps) compatible with our system.

## 2021-06-28 PROBLEM — G30.0 ALZHEIMER'S DISEASE WITH EARLY ONSET: Chronic | Status: ACTIVE | Noted: 2020-03-10

## 2021-08-17 NOTE — ED ADULT NURSE NOTE - HOW OFTEN DO YOU HAVE A DRINK CONTAINING ALCOHOL?
Patient urinated in bed, cleaned up and placed on bedpan at this time        Bill Arzate  08/17/21 1958 Never

## 2021-09-20 ENCOUNTER — APPOINTMENT (OUTPATIENT)
Dept: NEUROLOGY | Facility: CLINIC | Age: 71
End: 2021-09-20

## 2021-11-19 ENCOUNTER — INPATIENT (INPATIENT)
Facility: HOSPITAL | Age: 71
LOS: 4 days | Discharge: HOME | End: 2021-11-24
Attending: HOSPITALIST | Admitting: HOSPITALIST
Payer: MEDICARE

## 2021-11-19 VITALS
RESPIRATION RATE: 18 BRPM | SYSTOLIC BLOOD PRESSURE: 136 MMHG | TEMPERATURE: 97 F | OXYGEN SATURATION: 99 % | HEART RATE: 80 BPM | DIASTOLIC BLOOD PRESSURE: 75 MMHG

## 2021-11-19 DIAGNOSIS — F09 UNSPECIFIED MENTAL DISORDER DUE TO KNOWN PHYSIOLOGICAL CONDITION: ICD-10-CM

## 2021-11-19 DIAGNOSIS — Z90.710 ACQUIRED ABSENCE OF BOTH CERVIX AND UTERUS: Chronic | ICD-10-CM

## 2021-11-19 DIAGNOSIS — F03.90 UNSPECIFIED DEMENTIA, UNSPECIFIED SEVERITY, WITHOUT BEHAVIORAL DISTURBANCE, PSYCHOTIC DISTURBANCE, MOOD DISTURBANCE, AND ANXIETY: ICD-10-CM

## 2021-11-19 LAB
ALBUMIN SERPL ELPH-MCNC: 4.8 G/DL — SIGNIFICANT CHANGE UP (ref 3.3–5)
ALP SERPL-CCNC: 93 U/L — SIGNIFICANT CHANGE UP (ref 40–120)
ALT FLD-CCNC: 21 U/L — SIGNIFICANT CHANGE UP (ref 4–33)
ANION GAP SERPL CALC-SCNC: 11 MMOL/L — SIGNIFICANT CHANGE UP (ref 7–14)
AST SERPL-CCNC: 23 U/L — SIGNIFICANT CHANGE UP (ref 4–32)
BASOPHILS # BLD AUTO: 0.05 K/UL — SIGNIFICANT CHANGE UP (ref 0–0.2)
BASOPHILS NFR BLD AUTO: 0.6 % — SIGNIFICANT CHANGE UP (ref 0–2)
BILIRUB SERPL-MCNC: 0.7 MG/DL — SIGNIFICANT CHANGE UP (ref 0.2–1.2)
BUN SERPL-MCNC: 14 MG/DL — SIGNIFICANT CHANGE UP (ref 7–23)
CALCIUM SERPL-MCNC: 9.6 MG/DL — SIGNIFICANT CHANGE UP (ref 8.4–10.5)
CHLORIDE SERPL-SCNC: 101 MMOL/L — SIGNIFICANT CHANGE UP (ref 98–107)
CO2 SERPL-SCNC: 26 MMOL/L — SIGNIFICANT CHANGE UP (ref 22–31)
CREAT SERPL-MCNC: 0.88 MG/DL — SIGNIFICANT CHANGE UP (ref 0.5–1.3)
EOSINOPHIL # BLD AUTO: 0.09 K/UL — SIGNIFICANT CHANGE UP (ref 0–0.5)
EOSINOPHIL NFR BLD AUTO: 1 % — SIGNIFICANT CHANGE UP (ref 0–6)
GLUCOSE SERPL-MCNC: 92 MG/DL — SIGNIFICANT CHANGE UP (ref 70–99)
HCT VFR BLD CALC: 42.4 % — SIGNIFICANT CHANGE UP (ref 34.5–45)
HGB BLD-MCNC: 13.1 G/DL — SIGNIFICANT CHANGE UP (ref 11.5–15.5)
IANC: 5.78 K/UL — SIGNIFICANT CHANGE UP (ref 1.5–8.5)
IMM GRANULOCYTES NFR BLD AUTO: 0.5 % — SIGNIFICANT CHANGE UP (ref 0–1.5)
LYMPHOCYTES # BLD AUTO: 2.18 K/UL — SIGNIFICANT CHANGE UP (ref 1–3.3)
LYMPHOCYTES # BLD AUTO: 24.6 % — SIGNIFICANT CHANGE UP (ref 13–44)
MCHC RBC-ENTMCNC: 29.5 PG — SIGNIFICANT CHANGE UP (ref 27–34)
MCHC RBC-ENTMCNC: 30.9 GM/DL — LOW (ref 32–36)
MCV RBC AUTO: 95.5 FL — SIGNIFICANT CHANGE UP (ref 80–100)
MONOCYTES # BLD AUTO: 0.73 K/UL — SIGNIFICANT CHANGE UP (ref 0–0.9)
MONOCYTES NFR BLD AUTO: 8.2 % — SIGNIFICANT CHANGE UP (ref 2–14)
NEUTROPHILS # BLD AUTO: 5.78 K/UL — SIGNIFICANT CHANGE UP (ref 1.8–7.4)
NEUTROPHILS NFR BLD AUTO: 65.1 % — SIGNIFICANT CHANGE UP (ref 43–77)
NRBC # BLD: 0 /100 WBCS — SIGNIFICANT CHANGE UP
NRBC # FLD: 0 K/UL — SIGNIFICANT CHANGE UP
PLATELET # BLD AUTO: 332 K/UL — SIGNIFICANT CHANGE UP (ref 150–400)
POTASSIUM SERPL-MCNC: 3.9 MMOL/L — SIGNIFICANT CHANGE UP (ref 3.5–5.3)
POTASSIUM SERPL-SCNC: 3.9 MMOL/L — SIGNIFICANT CHANGE UP (ref 3.5–5.3)
PROT SERPL-MCNC: 7.6 G/DL — SIGNIFICANT CHANGE UP (ref 6–8.3)
RBC # BLD: 4.44 M/UL — SIGNIFICANT CHANGE UP (ref 3.8–5.2)
RBC # FLD: 13.6 % — SIGNIFICANT CHANGE UP (ref 10.3–14.5)
SARS-COV-2 RNA SPEC QL NAA+PROBE: SIGNIFICANT CHANGE UP
SODIUM SERPL-SCNC: 138 MMOL/L — SIGNIFICANT CHANGE UP (ref 135–145)
TOXICOLOGY SCREEN, DRUGS OF ABUSE, SERUM RESULT: SIGNIFICANT CHANGE UP
TSH SERPL-MCNC: 1.14 UIU/ML — SIGNIFICANT CHANGE UP (ref 0.27–4.2)
WBC # BLD: 8.87 K/UL — SIGNIFICANT CHANGE UP (ref 3.8–10.5)
WBC # FLD AUTO: 8.87 K/UL — SIGNIFICANT CHANGE UP (ref 3.8–10.5)

## 2021-11-19 PROCEDURE — 93010 ELECTROCARDIOGRAM REPORT: CPT

## 2021-11-19 PROCEDURE — 99285 EMERGENCY DEPT VISIT HI MDM: CPT | Mod: 25

## 2021-11-19 PROCEDURE — 99285 EMERGENCY DEPT VISIT HI MDM: CPT
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
## 2021-11-19 NOTE — ED BEHAVIORAL HEALTH ASSESSMENT NOTE - PREFERRED LANGUAGE
no pneumonia or PE : has pulm nodule which has been stable: > reason for fever: has contaminated positive blood clutres: pts family wants IDc onsult  9/9: seesm to be doing ok : abiotics have been dced: no SOB : no cough  9/10: breathing wise OK: off antibtiocs!  9/11: reslved English
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
## 2021-11-19 NOTE — ED BEHAVIORAL HEALTH ASSESSMENT NOTE - RISK ASSESSMENT
acute risks include dementia and poor social support. chronic risks include cognitive disorder, marital conflict. protective factors include no depressive episode, no jd, no psychosis, no substance abuse, no acces to weapons, no suicide attempt, no suicidal ideation, no homicidal ideation, no violent behavior. LOW SUICIDE risk. Low Acute Suicide Risk

## 2021-11-19 NOTE — ED BEHAVIORAL HEALTH ASSESSMENT NOTE - DESCRIPTION
dementia lives in [place] with  and poodle.  travels [place] every 3 weeks. Poor relationship with 2 children. Former teacher. calm and cooperative  Vital Signs Last 24 Hrs  T(C): 36.8 (19 Nov 2021 19:48), Max: 36.8 (19 Nov 2021 19:48)  T(F): 98.2 (19 Nov 2021 19:48), Max: 98.2 (19 Nov 2021 19:48)  HR: 63 (19 Nov 2021 19:48) (63 - 80)  BP: 176/74 (19 Nov 2021 19:48) (136/75 - 176/74)  BP(mean): --  RR: 19 (19 Nov 2021 19:48) (18 - 19)  SpO2: 100% (19 Nov 2021 19:48) (99% - 100%) dementia.  states pt received covid vaccine x3

## 2021-11-19 NOTE — ED BEHAVIORAL HEALTH ASSESSMENT NOTE - PSYCHIATRIC ISSUES AND PLAN (INCLUDE STANDING AND PRN MEDICATION)
continue Buspar 10mg BID, Donepezil 5mg BID, Memantine 5mg BID, Prozac 10mg daily. PRN Zyprexa 1.25mg PO/IM severe agitation
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
## 2021-11-19 NOTE — ED BEHAVIORAL HEALTH NOTE - BEHAVIORAL HEALTH NOTE
Worker then called patient’s  Mr. Brennan ([phone])  and he states he will handle the affairs with the dog. He also states that the hospital can keep the patient. He states that he has no one that can pick the patient up. He is requesting for the psychiatrist to call him. Worker then called patient’s  Mr. Brennan ([phone])  and he states he will handle the affairs with the dog. He also states that the hospital can keep the patient. He states that he has no one that can pick the patient up. He is requesting for the psychiatrist to call him. sw will follow. Worker then called patient’s  Mr. Monterroso ([phone])  and he states he will handle the affairs with the dog. He also states that the hospital can keep the patient. He states that he has no one that can pick the patient up. He is requesting for the psychiatrist to call him. sw will follow.

## 2021-11-19 NOTE — ED BEHAVIORAL HEALTH ASSESSMENT NOTE - DETAILS
no history suicidality APS referred pt to the ED, see BH note for contact info  Psychiatry [phone] APS
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
## 2021-11-19 NOTE — ED BEHAVIORAL HEALTH ASSESSMENT NOTE - CURRENT MEDICATION
Buspar 10mg BID, Donepezil 5mg BID, Memantine 5mg BID, Prozac 10mg daily, Tobradex eye drops left eye QID (verified with [facility] [phone]).

## 2021-11-19 NOTE — ED BEHAVIORAL HEALTH ASSESSMENT NOTE - SUMMARY
70yo  F, domiciled in [place], retired teacher, no formal psychiatric history, no admissions, no suicide attempts, no substance abuse, no violence/legal issues, PMH dementia, brought in by EMS referred by APS with concerns of wandering.  Patient presents with word finding difficulties and disorientation consistent with dementia. There is no current evidence of depression, jd or psychosis. She is not suicidal, homicidal or violent. There is no acute psychiatric symptoms that would require inpatient psychiatric admission. Patient was referred to anonymously to Adult Protective Services, who met with patient and determined her to be an unsafe discharge due to her cognitive impairment.  is [place] and pt has no nearby family or friends that can stay with her presently. Currently would recommend social admission until additional services can be arranged. PT DOES NOT HAVE CAPACITY TO LEAVE AMA.

## 2021-11-19 NOTE — ED ADULT TRIAGE NOTE - LOCATION:
February 5, 2018      Neymar Horner MD  [address] [place] [address]           [facility] - Neurosurgery 7th Fl  [address]  [facility] [address]  Phone: [phone]          Patient: Courtney Reilly   MR Number: [id number]   YOB: 1962   Date of Visit: 2/5/2018       Dear Dr. Neymar Horner:    Thank you for referring Courtney Reilly to me for evaluation. Attached you will find relevant portions of my assessment and plan of care.    If you have questions, please do not hesitate to call me. I look forward to following Courtney Reilly along with you.    Sincerely,    Bernadette Thompson MD    Enclosure  CC:  No Recipients    If you would like to receive this communication electronically, please contact [email or web address] or [phone] to request more information on [employer] access.    For providers and/or their staff who would like to refer a patient to [facility], please contact us through our one-stop-shop provider referral line, [facility] , at [phone].    If you feel you have received this communication in error or would no longer like to receive these types of communications, please e-mail [email or web address]          Left arm;

## 2021-11-19 NOTE — ED BEHAVIORAL HEALTH NOTE - BEHAVIORAL HEALTH NOTE
Pt to be medically admitted as confirmed with  RN. As per prior SW staff sign out to all collateral parties to be updated on status. Writer contacted APS supervisor, Huong Kurtz, at [phone] to provide update. APS Supervisor aware of pt's admission. Contact information provided.       Writer called pt's , Larry Brennan, at [phone] to also update on pt's status.  was displeased with phone call., requesting reason for admission and then stating "good keep her for the rest of her life".     [place] [facility] #[phone] also notified; writer spoke with on staff police supervisor.

## 2021-11-19 NOTE — ED ADULT NURSE NOTE - CHIEF COMPLAINT QUOTE
Patient brought to ER by EMS from home for psych evaluation. Adult protective services called police for this reoccurring incidents.. Pt is paranoid, driving around neighborhood aimlessly. Non compliance with meds. Pt is considered a risk to herself. Pt has a dog at home. .  Point of contact: APS: Nir Fofana: [phone]/Marsha Grullon: [phone]

## 2021-11-19 NOTE — ED BEHAVIORAL HEALTH ASSESSMENT NOTE - OTHER PAST PSYCHIATRIC HISTORY (INCLUDE DETAILS REGARDING ONSET, COURSE OF ILLNESS, INPATIENT/OUTPATIENT TREATMENT)
no psychiatric history. diagnosed with dementia in 2019 by neurologist Dr. Carlos Fleming at [facility]. put on Buspar 10mg BID and Donepezil 5mg BID, in July 2021 added Memantine 5mg BID, in October 2021 added Prozac 10mg daily.

## 2021-11-19 NOTE — ED ADULT TRIAGE NOTE - CHIEF COMPLAINT QUOTE
Patient brought to ER by EMS from home for psych evaluation. Adult protective services called police for this reoccurring incidents.. Pt is paranoid, driving around neighborhood aimlessly. Non compliance with meds. Pt is considered a risk to herself. Pt has a dog at home. .  Point of contact: APS: Nir Fofana: [phone]/Marsha Grullon: [phone]

## 2021-11-20 DIAGNOSIS — Z29.9 ENCOUNTER FOR PROPHYLACTIC MEASURES, UNSPECIFIED: ICD-10-CM

## 2021-11-20 DIAGNOSIS — F41.9 ANXIETY DISORDER, UNSPECIFIED: ICD-10-CM

## 2021-11-20 DIAGNOSIS — F03.90 UNSPECIFIED DEMENTIA, UNSPECIFIED SEVERITY, WITHOUT BEHAVIORAL DISTURBANCE, PSYCHOTIC DISTURBANCE, MOOD DISTURBANCE, AND ANXIETY: ICD-10-CM

## 2021-11-20 LAB
COVID-19 SPIKE DOMAIN AB INTERP: POSITIVE
COVID-19 SPIKE DOMAIN ANTIBODY RESULT: >250 U/ML — HIGH
SARS-COV-2 IGG+IGM SERPL QL IA: >250 U/ML — HIGH
SARS-COV-2 IGG+IGM SERPL QL IA: POSITIVE

## 2021-11-20 PROCEDURE — 12345: CPT | Mod: NC

## 2021-11-20 PROCEDURE — 99223 1ST HOSP IP/OBS HIGH 75: CPT

## 2021-11-20 RX ORDER — FLUOXETINE HCL 10 MG
10 CAPSULE ORAL DAILY
Refills: 0 | Status: DISCONTINUED | OUTPATIENT
Start: 2021-11-20 | End: 2021-11-24

## 2021-11-20 RX ORDER — DONEPEZIL HYDROCHLORIDE 10 MG/1
5 TABLET, FILM COATED ORAL
Refills: 0 | Status: DISCONTINUED | OUTPATIENT
Start: 2021-11-20 | End: 2021-11-24

## 2021-11-20 RX ORDER — ONDANSETRON 8 MG/1
4 TABLET, FILM COATED ORAL EVERY 8 HOURS
Refills: 0 | Status: DISCONTINUED | OUTPATIENT
Start: 2021-11-20 | End: 2021-11-24

## 2021-11-20 RX ORDER — LANOLIN ALCOHOL/MO/W.PET/CERES
3 CREAM (GRAM) TOPICAL AT BEDTIME
Refills: 0 | Status: DISCONTINUED | OUTPATIENT
Start: 2021-11-20 | End: 2021-11-24

## 2021-11-20 RX ORDER — OLANZAPINE 15 MG/1
1.25 TABLET, FILM COATED ORAL ONCE
Refills: 0 | Status: DISCONTINUED | OUTPATIENT
Start: 2021-11-20 | End: 2021-11-21

## 2021-11-20 RX ORDER — MEMANTINE HYDROCHLORIDE 10 MG/1
5 TABLET ORAL
Refills: 0 | Status: DISCONTINUED | OUTPATIENT
Start: 2021-11-20 | End: 2021-11-24

## 2021-11-20 RX ORDER — TOBRAMYCIN AND DEXAMETHASONE 1; 3 MG/ML; MG/ML
2 SUSPENSION/ DROPS OPHTHALMIC
Qty: 0 | Refills: 0 | DISCHARGE

## 2021-11-20 RX ORDER — OLANZAPINE 15 MG/1
1.25 TABLET, FILM COATED ORAL EVERY 8 HOURS
Refills: 0 | Status: DISCONTINUED | OUTPATIENT
Start: 2021-11-20 | End: 2021-11-21

## 2021-11-20 RX ORDER — ENOXAPARIN SODIUM 100 MG/ML
40 INJECTION SUBCUTANEOUS DAILY
Refills: 0 | Status: DISCONTINUED | OUTPATIENT
Start: 2021-11-20 | End: 2021-11-24

## 2021-11-20 RX ORDER — FLUOXETINE HCL 10 MG
1 CAPSULE ORAL
Qty: 0 | Refills: 0 | DISCHARGE

## 2021-11-20 RX ORDER — DONEPEZIL HYDROCHLORIDE 10 MG/1
1 TABLET, FILM COATED ORAL
Qty: 0 | Refills: 0 | DISCHARGE

## 2021-11-20 RX ORDER — MEMANTINE HYDROCHLORIDE 10 MG/1
1 TABLET ORAL
Qty: 0 | Refills: 0 | DISCHARGE

## 2021-11-20 RX ORDER — ACETAMINOPHEN 500 MG
650 TABLET ORAL EVERY 6 HOURS
Refills: 0 | Status: DISCONTINUED | OUTPATIENT
Start: 2021-11-20 | End: 2021-11-24

## 2021-11-20 RX ADMIN — MEMANTINE HYDROCHLORIDE 5 MILLIGRAM(S): 10 TABLET ORAL at 22:13

## 2021-11-20 RX ADMIN — DONEPEZIL HYDROCHLORIDE 5 MILLIGRAM(S): 10 TABLET, FILM COATED ORAL at 22:13

## 2021-11-20 RX ADMIN — DONEPEZIL HYDROCHLORIDE 5 MILLIGRAM(S): 10 TABLET, FILM COATED ORAL at 10:23

## 2021-11-20 RX ADMIN — Medication 10 MILLIGRAM(S): at 22:13

## 2021-11-20 RX ADMIN — Medication 10 MILLIGRAM(S): at 10:22

## 2021-11-20 RX ADMIN — Medication 10 MILLIGRAM(S): at 11:51

## 2021-11-20 NOTE — H&P ADULT - PROBLEM SELECTOR PLAN 1
Plan per :   Patient presents with word finding difficulties and disorientation consistent with dementia. There is no current evidence of depression, jd or psychosis. She is not suicidal, homicidal or violent. There is no acute psychiatric symptoms that would require inpatient psychiatric admission. Patient was referred to anonymously to Adult Protective Services, who met with patient and determined her to be an unsafe discharge due to her cognitive impairment.  is [place] and pt has no nearby family or friends that can stay with her presently. Currently would recommend social admission until additional services can be arranged. PT DOES NOT HAVE CAPACITY TO LEAVE AMA.  - MSW following  - F/U UA, TSH, VitD25 level,

## 2021-11-20 NOTE — H&P ADULT - NEGATIVE NEUROLOGICAL SYMPTOMS
no transient paralysis/no weakness/no generalized seizures/no syncope/no vertigo/no loss of sensation/no headache

## 2021-11-20 NOTE — H&P ADULT - NSHPLABSRESULTS_GEN_ALL_CORE
- EKG: SR @ 62b/ min, 1* ST AVB, QT/ QTC= 414/ 420.  - COVID PCR: Not detected.  - Serum Tox: Clean                          13.1   8.87  )-----------( 332      ( 19 Nov 2021 17:40 )             42.4   11-19    138  |  101  |  14  ----------------------------<  92  3.9   |  26  |  0.88    Ca    9.6      19 Nov 2021 17:40    TPro  7.6  /  Alb  4.8  /  TBili  0.7  /  DBili  x   /  AST  23  /  ALT  21  /  AlkPhos  93  11-19

## 2021-11-20 NOTE — PROGRESS NOTE ADULT - SUBJECTIVE AND OBJECTIVE BOX
Patient is a 71y old  Female who presents with a chief complaint of Poor recall (20 Nov 2021 01:54)      SUBJECTIVE / OVERNIGHT EVENTS:    Patient seen and examined at bedside. No dyspnea, chest pain. No active SI/HI.    MEDICATIONS  (STANDING):  busPIRone 10 milliGRAM(s) Oral two times a day  donepezil 5 milliGRAM(s) Oral <User Schedule>  enoxaparin Injectable 40 milliGRAM(s) SubCutaneous daily  FLUoxetine 10 milliGRAM(s) Oral daily  memantine 5 milliGRAM(s) Oral two times a day    MEDICATIONS  (PRN):  acetaminophen     Tablet .. 650 milliGRAM(s) Oral every 6 hours PRN Temp greater or equal to 38C (100.4F), Mild Pain (1 - 3)  aluminum hydroxide/magnesium hydroxide/simethicone Suspension 30 milliLiter(s) Oral every 4 hours PRN Dyspepsia  melatonin 3 milliGRAM(s) Oral at bedtime PRN Insomnia  OLANZapine 1.25 milliGRAM(s) Oral every 8 hours PRN agitatrion  OLANZapine Injectable 1.25 milliGRAM(s) IntraMuscular once PRN sever agitation  ondansetron Injectable 4 milliGRAM(s) IV Push every 8 hours PRN Nausea and/or Vomiting      Vital Signs Last 24 Hrs  T(C): 36.8 (20 Nov 2021 13:39), Max: 36.8 (19 Nov 2021 19:48)  T(F): 98.2 (20 Nov 2021 13:39), Max: 98.2 (19 Nov 2021 19:48)  HR: 60 (20 Nov 2021 13:39) (60 - 63)  BP: 110/70 (20 Nov 2021 13:39) (110/70 - 176/74)  BP(mean): --  RR: 16 (20 Nov 2021 13:39) (16 - 19)  SpO2: 98% (20 Nov 2021 13:39) (98% - 100%)  CAPILLARY BLOOD GLUCOSE        I&O's Summary      PHYSICAL EXAM:  Vital Signs Last 24 Hrs  T(C): 36.8 (11-20-21 @ 13:39)  T(F): 98.2 (11-20-21 @ 13:39), Max: 98.2 (11-19-21 @ 19:48)  HR: 60 (11-20-21 @ 13:39) (60 - 63)  BP: 110/70 (11-20-21 @ 13:39)  BP(mean): --  RR: 16 (11-20-21 @ 13:39) (16 - 19)  SpO2: 98% (11-20-21 @ 13:39) (98% - 100%)  Wt(kg): --    Constitutional: NAD, awake and alert  EYES: EOMI  ENT:  Normal Hearing, no tonsillar exudates   Neck: Soft and supple , no thyromegaly   Respiratory: Breath sounds are clear bilaterally, No wheezing, rales or rhonchi  Cardiovascular: S1 and S2, regular rate and rhythm, no Murmurs, gallops or rubs, no JVD,    Gastrointestinal: Bowel Sounds present, soft, nontender, nondistended, no guarding, no rebound  Extremities: No cyanosis or clubbing; warm to touch  Vascular: 2+ peripheral pulses lower ex  Neurological: No focal deficits, CN II-XII intact bilaterally, sensation to light touch intact in all extremities, gait intact. Pupils are equally reactive to light and symmetrical in size.   Musculoskeletal: 5/5 strength b/l upper and lower extremities; no joint swelling.  Skin: No rashes  Psych: no depression or anhedonia, AAOx3  HEME: no bruises, no nose bleeds      LABS:                        13.1   8.87  )-----------( 332      ( 19 Nov 2021 17:40 )             42.4     11-19    138  |  101  |  14  ----------------------------<  92  3.9   |  26  |  0.88    Ca    9.6      19 Nov 2021 17:40    TPro  7.6  /  Alb  4.8  /  TBili  0.7  /  DBili  x   /  AST  23  /  ALT  21  /  AlkPhos  93  11-19              RADIOLOGY & ADDITIONAL TESTS:    Imaging Personally Reviewed:    Consultant(s) Notes Reviewed:      Care Discussed with Consultants/Other Providers:

## 2021-11-20 NOTE — H&P ADULT - ASSESSMENT
71F, history of dementia and anxiety brought to the Ed after wandering into a police station 6:43 am on 11/19.

## 2021-11-20 NOTE — H&P ADULT - NSHPSOCIALHISTORY_GEN_ALL_CORE
.  Lives with her dog.  Denies Nicotine, ETOH, Illicit/ recreational drug use/ history.   Retired .  Last Mammogram: can not recall when: normal.  Last Colonoscopy: can not recall when. Polyps.  Flu Vax: Pt can not recall  COVID Vax Pt says she had " all 3 shots."

## 2021-11-20 NOTE — H&P ADULT - HISTORY OF PRESENT ILLNESS
History provided from the patient, with collateral information supplied from the chart, due to the pt with poor recall of the events that caused her to be in  and ED.      71F, self ambulating, history of dementia, brought in by EMS referred by APS with concerns of wandering into the police station at 6:43 am on 11/19 and was sent to [facility].  The pt can not recall how she came or reason for being in the ED. She says she is agitated and wants to go home. But also difficulty remembering event, names of object and word finding. She is concerned for her dog at home. Per MSW note: called [facility] police station [phone] and spoke to Officer Josefa serrano #[id number] who states that they are aware of the patient’s dog being at the home and they are taking care of the dog. She denies HA, dizziness, falls, SOB, cough, chest pain, palpitation, nausea, vomit, diarrhea, constipation, abdominal pain, dysuria, chills, diaphoresis.       - Patient was referred to anonymously to Adult Protective Services, who met with patient and determined her to be an unsafe discharge due to her cognitive impairment.  is [place] and pt has no nearby family or friends that can stay with her presently.   -  consult and evaluation is in the chart.     Vitals: T max: 98.2 oral, HR = 63b/ min, BP =113/ 53, RR= 16b/ min, SPO2= 100% ra

## 2021-11-20 NOTE — PROGRESS NOTE ADULT - PROBLEM SELECTOR PLAN 1
Plan per :   Patient presents with word finding difficulties and disorientation consistent with dementia. There is no current evidence of depression, jd or psychosis. She is not suicidal, homicidal or violent. There is no acute psychiatric symptoms that would require inpatient psychiatric admission. Patient was referred to anonymously to Adult Protective Services, who met with patient and determined her to be an unsafe discharge due to her cognitive impairment.  is [place] and pt has no nearby family or friends that can stay with her presently. Currently would recommend social admission until additional services can be arranged. PT DOES NOT HAVE CAPACITY TO LEAVE AMA.  - was being managed outpatient at [facility] by Dr. Dr. Carlos Fleming [phone], unable to reach over weekend.  - c/w memantine/aricept  - MSW following  - TSH wnl  - f/u UA

## 2021-11-20 NOTE — H&P ADULT - PROBLEM SELECTOR PLAN 4
VTE with Lovenox 40 mg sub cut daily.  Fall , Aspiration, safety and seizure precautions.  BOYD and LISBET milian.

## 2021-11-20 NOTE — ED ADULT NURSE REASSESSMENT NOTE - NS ED NURSE REASSESS COMMENT FT1
Received report from night RN, pt calm & cooperative denies si/hi/avh presently, pt tolerated breakfast medicated as per MD rx with am meds eval on going.

## 2021-11-20 NOTE — PROGRESS NOTE ADULT - PROBLEM SELECTOR PLAN 2
Continue Buspar and Zyprexa 1.25 po / IM recommended by .  Ordered for Prozac, per  patient should not be taking prozac as worsens symptoms regarding dementia, asked behavioral health for f/u as patient expressing depressive symptoms, no active SI.

## 2021-11-21 LAB
APPEARANCE UR: CLEAR — SIGNIFICANT CHANGE UP
BILIRUB UR-MCNC: NEGATIVE — SIGNIFICANT CHANGE UP
COLOR SPEC: SIGNIFICANT CHANGE UP
DIFF PNL FLD: NEGATIVE — SIGNIFICANT CHANGE UP
GLUCOSE UR QL: NEGATIVE — SIGNIFICANT CHANGE UP
HCV AB S/CO SERPL IA: 0.07 S/CO — SIGNIFICANT CHANGE UP (ref 0–0.99)
HCV AB SERPL-IMP: SIGNIFICANT CHANGE UP
KETONES UR-MCNC: NEGATIVE — SIGNIFICANT CHANGE UP
LEUKOCYTE ESTERASE UR-ACNC: ABNORMAL
NITRITE UR-MCNC: NEGATIVE — SIGNIFICANT CHANGE UP
PH UR: 7.5 — SIGNIFICANT CHANGE UP (ref 5–8)
PROT UR-MCNC: NEGATIVE — SIGNIFICANT CHANGE UP
SP GR SPEC: 1.01 — SIGNIFICANT CHANGE UP (ref 1–1.05)
UROBILINOGEN FLD QL: SIGNIFICANT CHANGE UP

## 2021-11-21 PROCEDURE — 99233 SBSQ HOSP IP/OBS HIGH 50: CPT

## 2021-11-21 RX ORDER — QUETIAPINE FUMARATE 200 MG/1
12.5 TABLET, FILM COATED ORAL EVERY 6 HOURS
Refills: 0 | Status: DISCONTINUED | OUTPATIENT
Start: 2021-11-21 | End: 2021-11-24

## 2021-11-21 RX ORDER — OLANZAPINE 15 MG/1
1.25 TABLET, FILM COATED ORAL EVERY 6 HOURS
Refills: 0 | Status: DISCONTINUED | OUTPATIENT
Start: 2021-11-21 | End: 2021-11-24

## 2021-11-21 RX ADMIN — Medication 10 MILLIGRAM(S): at 17:25

## 2021-11-21 RX ADMIN — MEMANTINE HYDROCHLORIDE 5 MILLIGRAM(S): 10 TABLET ORAL at 05:49

## 2021-11-21 RX ADMIN — MEMANTINE HYDROCHLORIDE 5 MILLIGRAM(S): 10 TABLET ORAL at 17:25

## 2021-11-21 RX ADMIN — ENOXAPARIN SODIUM 40 MILLIGRAM(S): 100 INJECTION SUBCUTANEOUS at 11:33

## 2021-11-21 RX ADMIN — DONEPEZIL HYDROCHLORIDE 5 MILLIGRAM(S): 10 TABLET, FILM COATED ORAL at 20:29

## 2021-11-21 RX ADMIN — QUETIAPINE FUMARATE 12.5 MILLIGRAM(S): 200 TABLET, FILM COATED ORAL at 15:34

## 2021-11-21 RX ADMIN — Medication 10 MILLIGRAM(S): at 05:49

## 2021-11-21 RX ADMIN — Medication 10 MILLIGRAM(S): at 11:33

## 2021-11-21 RX ADMIN — DONEPEZIL HYDROCHLORIDE 5 MILLIGRAM(S): 10 TABLET, FILM COATED ORAL at 08:25

## 2021-11-21 NOTE — PROGRESS NOTE ADULT - PROBLEM SELECTOR PLAN 1
Plan per :   Patient presents with word finding difficulties and disorientation consistent with dementia. There is no current evidence of depression, jd or psychosis. She is not suicidal, homicidal or violent. There is no acute psychiatric symptoms that would require inpatient psychiatric admission. Patient was referred to anonymously to Adult Protective Services, who met with patient and determined her to be an unsafe discharge due to her cognitive impairment.  is [place] and pt has no nearby family or friends that can stay with her presently. Currently would recommend social admission until additional services can be arranged. PT DOES NOT HAVE CAPACITY TO LEAVE AMA.  - was being managed outpatient at [facility] by Dr. Dr. Carlos Fleming [phone], unable to reach over weekend.  - c/w memantine/aricept  - MSW following  - TSH wnl  - SW regarding dispo

## 2021-11-21 NOTE — PROGRESS NOTE ADULT - SUBJECTIVE AND OBJECTIVE BOX
Patient is a 71y old  Female who presents with a chief complaint of Poor recall (2021 16:44)      SUBJECTIVE / OVERNIGHT EVENTS:    Patient seen and examined at bedside. Mental status improved, pt still w/ memory deficits, cannot recall events brought her to ED. Tolerating po intake, having regular BMs.    MEDICATIONS  (STANDING):  busPIRone 10 milliGRAM(s) Oral two times a day  donepezil 5 milliGRAM(s) Oral <User Schedule>  enoxaparin Injectable 40 milliGRAM(s) SubCutaneous daily  FLUoxetine 10 milliGRAM(s) Oral daily  memantine 5 milliGRAM(s) Oral two times a day    MEDICATIONS  (PRN):  acetaminophen     Tablet .. 650 milliGRAM(s) Oral every 6 hours PRN Temp greater or equal to 38C (100.4F), Mild Pain (1 - 3)  aluminum hydroxide/magnesium hydroxide/simethicone Suspension 30 milliLiter(s) Oral every 4 hours PRN Dyspepsia  melatonin 3 milliGRAM(s) Oral at bedtime PRN Insomnia  OLANZapine Injectable 1.25 milliGRAM(s) IntraMuscular every 6 hours PRN SEVERE Agitation  ondansetron Injectable 4 milliGRAM(s) IV Push every 8 hours PRN Nausea and/or Vomiting  QUEtiapine 12.5 milliGRAM(s) Oral every 6 hours PRN Anxiety or agitation      Vital Signs Last 24 Hrs  T(C): 36.6 (2021 10:36), Max: 36.9 (2021 05:45)  T(F): 97.8 (2021 10:36), Max: 98.5 (2021 05:45)  HR: 67 (2021 10:36) (67 - 74)  BP: 119/68 (2021 10:36) (108/62 - 119/68)  BP(mean): --  RR: 18 (2021 10:36) (17 - 18)  SpO2: 97% (2021 10:36) (97% - 100%)  CAPILLARY BLOOD GLUCOSE        I&O's Summary      PHYSICAL EXAM:  Vital Signs Last 24 Hrs  T(C): 36.6 (21 @ 10:36)  T(F): 97.8 (21 @ 10:36), Max: 98.5 (21 @ 05:45)  HR: 67 (21 @ 10:36) (67 - 74)  BP: 119/68 (21 @ 10:36)  BP(mean): --  RR: 18 (21 @ 10:36) (17 - 18)  SpO2: 97% (21 @ 10:36) (97% - 100%)  Wt(kg): --    Constitutional: NAD, awake and alert  EYES: EOMI  ENT:  Normal Hearing, no tonsillar exudates   Neck: Soft and supple , no thyromegaly   Respiratory: Breath sounds are clear bilaterally, No wheezing, rales or rhonchi  Cardiovascular: S1 and S2, regular rate and rhythm, no Murmurs, gallops or rubs, no JVD,    Gastrointestinal: Bowel Sounds present, soft, nontender, nondistended, no guarding, no rebound  Extremities: No cyanosis or clubbing; warm to touch  Vascular: 2+ peripheral pulses lower ex  Neurological: No focal deficits, CN II-XII intact bilaterally, sensation to light touch intact in all extremities. Pupils are equally reactive to light and symmetrical in size.   Musculoskeletal: 5/5 strength b/l upper and lower extremities; no joint swelling.  Skin: No rashes  Psych: no depression or anhedonia, AAOx3  HEME: no bruises, no nose bleeds      LABS:                        13.1   8.87  )-----------( 332      ( 2021 17:40 )             42.4         138  |  101  |  14  ----------------------------<  92  3.9   |  26  |  0.88    Ca    9.6      2021 17:40    TPro  7.6  /  Alb  4.8  /  TBili  0.7  /  DBili  x   /  AST  23  /  ALT  21  /  AlkPhos  93            Urinalysis Basic - ( 2021 11:50 )    Color: Light Yellow / Appearance: Clear / S.010 / pH: x  Gluc: x / Ketone: Negative  / Bili: Negative / Urobili: <2 mg/dL   Blood: x / Protein: Negative / Nitrite: Negative   Leuk Esterase: Large / RBC: 1 /HPF / WBC 15 /HPF   Sq Epi: x / Non Sq Epi: 2 /HPF / Bacteria: Negative        RADIOLOGY & ADDITIONAL TESTS:    Imaging Personally Reviewed:    Consultant(s) Notes Reviewed:      Care Discussed with Consultants/Other Providers:

## 2021-11-21 NOTE — PROGRESS NOTE ADULT - PROBLEM SELECTOR PLAN 2
Continue Buspar and Zyprexa 1.25 po / IM recommended by .  Ordered for Prozac, per  patient should not be taking prozac as worsens symptoms regarding dementia, asked behavioral health for f/u as patient expressing depressive symptoms, no active SI. Will see Monday, c/w at this time given improvement in patient's mentation.

## 2021-11-22 LAB
24R-OH-CALCIDIOL SERPL-MCNC: 26 NG/ML — LOW (ref 30–80)
A1C WITH ESTIMATED AVERAGE GLUCOSE RESULT: 5.3 % — SIGNIFICANT CHANGE UP (ref 4–5.6)
ANION GAP SERPL CALC-SCNC: 12 MMOL/L — SIGNIFICANT CHANGE UP (ref 7–14)
BUN SERPL-MCNC: 20 MG/DL — SIGNIFICANT CHANGE UP (ref 7–23)
CALCIUM SERPL-MCNC: 9.1 MG/DL — SIGNIFICANT CHANGE UP (ref 8.4–10.5)
CHLORIDE SERPL-SCNC: 105 MMOL/L — SIGNIFICANT CHANGE UP (ref 98–107)
CHOLEST SERPL-MCNC: 228 MG/DL — HIGH
CO2 SERPL-SCNC: 22 MMOL/L — SIGNIFICANT CHANGE UP (ref 22–31)
CREAT SERPL-MCNC: 0.82 MG/DL — SIGNIFICANT CHANGE UP (ref 0.5–1.3)
ESTIMATED AVERAGE GLUCOSE: 105 — SIGNIFICANT CHANGE UP
FOLATE SERPL-MCNC: 10.5 NG/ML — SIGNIFICANT CHANGE UP (ref 3.1–17.5)
GLUCOSE SERPL-MCNC: 91 MG/DL — SIGNIFICANT CHANGE UP (ref 70–99)
HCT VFR BLD CALC: 39 % — SIGNIFICANT CHANGE UP (ref 34.5–45)
HDLC SERPL-MCNC: 94 MG/DL — SIGNIFICANT CHANGE UP
HGB BLD-MCNC: 12.6 G/DL — SIGNIFICANT CHANGE UP (ref 11.5–15.5)
LIPID PNL WITH DIRECT LDL SERPL: 119 MG/DL — HIGH
MAGNESIUM SERPL-MCNC: 2.3 MG/DL — SIGNIFICANT CHANGE UP (ref 1.6–2.6)
MCHC RBC-ENTMCNC: 29.9 PG — SIGNIFICANT CHANGE UP (ref 27–34)
MCHC RBC-ENTMCNC: 32.3 GM/DL — SIGNIFICANT CHANGE UP (ref 32–36)
MCV RBC AUTO: 92.6 FL — SIGNIFICANT CHANGE UP (ref 80–100)
NON HDL CHOLESTEROL: 134 MG/DL — HIGH
NRBC # BLD: 0 /100 WBCS — SIGNIFICANT CHANGE UP
NRBC # FLD: 0 K/UL — SIGNIFICANT CHANGE UP
PHOSPHATE SERPL-MCNC: 3.7 MG/DL — SIGNIFICANT CHANGE UP (ref 2.5–4.5)
PLATELET # BLD AUTO: 282 K/UL — SIGNIFICANT CHANGE UP (ref 150–400)
POTASSIUM SERPL-MCNC: 4.2 MMOL/L — SIGNIFICANT CHANGE UP (ref 3.5–5.3)
POTASSIUM SERPL-SCNC: 4.2 MMOL/L — SIGNIFICANT CHANGE UP (ref 3.5–5.3)
RBC # BLD: 4.21 M/UL — SIGNIFICANT CHANGE UP (ref 3.8–5.2)
RBC # FLD: 13.6 % — SIGNIFICANT CHANGE UP (ref 10.3–14.5)
SODIUM SERPL-SCNC: 139 MMOL/L — SIGNIFICANT CHANGE UP (ref 135–145)
TRIGL SERPL-MCNC: 74 MG/DL — SIGNIFICANT CHANGE UP
VIT B12 SERPL-MCNC: 569 PG/ML — SIGNIFICANT CHANGE UP (ref 200–900)
WBC # BLD: 6.72 K/UL — SIGNIFICANT CHANGE UP (ref 3.8–10.5)
WBC # FLD AUTO: 6.72 K/UL — SIGNIFICANT CHANGE UP (ref 3.8–10.5)

## 2021-11-22 PROCEDURE — 99232 SBSQ HOSP IP/OBS MODERATE 35: CPT

## 2021-11-22 RX ADMIN — MEMANTINE HYDROCHLORIDE 5 MILLIGRAM(S): 10 TABLET ORAL at 06:45

## 2021-11-22 RX ADMIN — Medication 10 MILLIGRAM(S): at 12:19

## 2021-11-22 RX ADMIN — DONEPEZIL HYDROCHLORIDE 5 MILLIGRAM(S): 10 TABLET, FILM COATED ORAL at 20:27

## 2021-11-22 RX ADMIN — Medication 10 MILLIGRAM(S): at 06:45

## 2021-11-22 RX ADMIN — MEMANTINE HYDROCHLORIDE 5 MILLIGRAM(S): 10 TABLET ORAL at 17:38

## 2021-11-22 RX ADMIN — ENOXAPARIN SODIUM 40 MILLIGRAM(S): 100 INJECTION SUBCUTANEOUS at 12:19

## 2021-11-22 RX ADMIN — DONEPEZIL HYDROCHLORIDE 5 MILLIGRAM(S): 10 TABLET, FILM COATED ORAL at 09:37

## 2021-11-22 RX ADMIN — Medication 3 MILLIGRAM(S): at 20:27

## 2021-11-22 RX ADMIN — Medication 10 MILLIGRAM(S): at 17:38

## 2021-11-22 NOTE — PROGRESS NOTE ADULT - NSPROGADDITIONALINFOA_GEN_ALL_CORE
With CM, spoke with  by phone, Dr. Artie Monterroso (previously internal med MD, currently works in business field).  He has been working with [facility] neurologist, pt likely with dementia, has neuropsych testing on 2/1/21.  Pt lives in [place], does [employer], drives to the diner, uses her credit card appropriately.  Has a daughter, who is a physician in [place].  not due to return to [place] until Dec 1st.  Advised  hire privately an aide, referred to [facility].

## 2021-11-22 NOTE — PROGRESS NOTE ADULT - SUBJECTIVE AND OBJECTIVE BOX
[facility] Division of Hospital Medicine  Franny Cox MD  Pager (M-F, 8A-5P):  In-house pager [phone]; Long-range pager [phone]  Other Times:  Please page Hospitalist in Charge -  In-house pager [phone]    Patient is a 71y old  Female who presents with a chief complaint of Poor recall (2021 15:56)    SUBJECTIVE / OVERNIGHT EVENTS:  ....  ADDITIONAL REVIEW OF SYSTEMS:    MEDICATIONS  (STANDING):  busPIRone 10 milliGRAM(s) Oral two times a day  donepezil 5 milliGRAM(s) Oral <User Schedule>  enoxaparin Injectable 40 milliGRAM(s) SubCutaneous daily  FLUoxetine 10 milliGRAM(s) Oral daily  memantine 5 milliGRAM(s) Oral two times a day    MEDICATIONS  (PRN):  acetaminophen     Tablet .. 650 milliGRAM(s) Oral every 6 hours PRN Temp greater or equal to 38C (100.4F), Mild Pain (1 - 3)  aluminum hydroxide/magnesium hydroxide/simethicone Suspension 30 milliLiter(s) Oral every 4 hours PRN Dyspepsia  melatonin 3 milliGRAM(s) Oral at bedtime PRN Insomnia  OLANZapine Injectable 1.25 milliGRAM(s) IntraMuscular every 6 hours PRN SEVERE Agitation  ondansetron Injectable 4 milliGRAM(s) IV Push every 8 hours PRN Nausea and/or Vomiting  QUEtiapine 12.5 milliGRAM(s) Oral every 6 hours PRN Anxiety or agitation    PHYSICAL EXAM:  Vital Signs Last 24 Hrs  T(C): 36.7 (2021 06:26), Max: 36.7 (2021 05:37)  T(F): 98.1 (2021 06:26), Max: 98.1 (2021 06:26)  HR: 70 (2021 06:26) (67 - 71)  BP: 120/70 (2021 06:26) (118/64 - 120/70)  BP(mean): --  RR: 18 (2021 06:26) (17 - 18)  SpO2: 99% (2021 06:26) (97% - 100%)    Constitutional: NAD, awake and alert  EYES: EOMI  ENT:  Normal Hearing, no tonsillar exudates   Neck: Soft and supple , no thyromegaly   Respiratory: Breath sounds are clear bilaterally, No wheezing, rales or rhonchi  Cardiovascular: S1 and S2, regular rate and rhythm, no Murmurs, gallops or rubs, no JVD,    Gastrointestinal: Bowel Sounds present, soft, nontender, nondistended, no guarding, no rebound  Extremities: No cyanosis or clubbing; warm to touch  Vascular: 2+ peripheral pulses lower ex  Neurological: No focal deficits, CN II-XII intact bilaterally, sensation to light touch intact in all extremities. Pupils are equally reactive to light and symmetrical in size.   Musculoskeletal: 5/5 strength b/l upper and lower extremities; no joint swelling.  Skin: No rashes  Psych: no depression or anhedonia, AAOx3  HEME: no bruises, no nose bleeds    LABS:                        12.6   6.72  )-----------( 282      ( 2021 06:34 )             39.0     11-22    139  |  105  |  20  ----------------------------<  91  4.2   |  22  |  0.82    Ca    9.1      2021 06:34  Phos  3.7     11-22  Mg     2.30     11-22    Urinalysis Basic - ( 2021 11:50 )  Color: Light Yellow / Appearance: Clear / S.010 / pH: x  Gluc: x / Ketone: Negative  / Bili: Negative / Urobili: <2 mg/dL   Blood: x / Protein: Negative / Nitrite: Negative   Leuk Esterase: Large / RBC: 1 /HPF / WBC 15 /HPF   Sq Epi: x / Non Sq Epi: 2 /HPF / Bacteria: Negative    RADIOLOGY & ADDITIONAL TESTS:  Results Reviewed:   Imaging Personally Reviewed:  Electrocardiogram Personally Reviewed:    COORDINATION OF CARE:  Care Discussed with Consultants/Other Providers [Y/N]: RN, SW, CM, ACP re overall care   Prior or Outpatient Records Reviewed [Y/N]:   [facility] Division of Hospital Medicine  Franny Cox MD  Pager (M-F, 8A-5P):  In-house pager [phone]; Long-range pager [phone]  Other Times:  Please page Hospitalist in Charge -  In-house pager [phone]    Patient is a 71y old  Female who presents with a chief complaint of Poor recall (2021 15:56)    SUBJECTIVE / OVERNIGHT EVENTS:  No problems reported over night. Staff reports she seems lonely.  Seen earlier, resting in bed, calm.  Oriented self, hospital, , Pres Elan.  Not sure why she is here.   called from the .  He said she likely has dementia, can function on her own but had a breakdown, he tried to get help in the home but didn't have time.  Pt spoke with  by phone privately.   ADDITIONAL REVIEW OF SYSTEMS:    MEDICATIONS  (STANDING):  busPIRone 10 milliGRAM(s) Oral two times a day  donepezil 5 milliGRAM(s) Oral <User Schedule>  enoxaparin Injectable 40 milliGRAM(s) SubCutaneous daily  FLUoxetine 10 milliGRAM(s) Oral daily  memantine 5 milliGRAM(s) Oral two times a day    MEDICATIONS  (PRN):  acetaminophen     Tablet .. 650 milliGRAM(s) Oral every 6 hours PRN Temp greater or equal to 38C (100.4F), Mild Pain (1 - 3)  aluminum hydroxide/magnesium hydroxide/simethicone Suspension 30 milliLiter(s) Oral every 4 hours PRN Dyspepsia  melatonin 3 milliGRAM(s) Oral at bedtime PRN Insomnia  OLANZapine Injectable 1.25 milliGRAM(s) IntraMuscular every 6 hours PRN SEVERE Agitation  ondansetron Injectable 4 milliGRAM(s) IV Push every 8 hours PRN Nausea and/or Vomiting  QUEtiapine 12.5 milliGRAM(s) Oral every 6 hours PRN Anxiety or agitation    PHYSICAL EXAM:  Vital Signs Last 24 Hrs  T(C): 36.7 (2021 06:26), Max: 36.7 (2021 05:37)  T(F): 98.1 (2021 06:26), Max: 98.1 (2021 06:26)  HR: 70 (2021 06:26) (67 - 71)  BP: 120/70 (2021 06:26) (118/64 - 120/70)  BP(mean): --  RR: 18 (2021 06:26) (17 - 18)  SpO2: 99% (2021 06:26) (97% - 100%)    Constitutional: well kempt, WF, resting in bed, walks in bender independently  Respiratory: Breath sounds are clear bilaterally, No wheezing, rales or rhonchi  Cardiovascular: S1 and S2, regular rate and rhythm, no Murmurs, gallops or rubs, no JVD,    Gastrointestinal: Bowel Sounds present, soft, nontender, nondistended, no guarding, no rebound  Neurological: nonfocal  Musculoskeletal: 5/5 strength b/l upper and lower extremities; no joint swelling.  Skin: No rashes  Psych: Oriented self, hospital, , "I don't do dates", Pres "Elan"    LABS:                        12.6   6.72  )-----------( 282      ( 2021 06:34 )             39.0     11-22    139  |  105  |  20  ----------------------------<  91  4.2   |  22  |  0.82    Ca    9.1      2021 06:34  Phos  3.7     11-22  Mg     2.30     11-22    Urinalysis Basic - ( 2021 11:50 )  Color: Light Yellow / Appearance: Clear / S.010 / pH: x  Gluc: x / Ketone: Negative  / Bili: Negative / Urobili: <2 mg/dL   Blood: x / Protein: Negative / Nitrite: Negative   Leuk Esterase: Large / RBC: 1 /HPF / WBC 15 /HPF   Sq Epi: x / Non Sq Epi: 2 /HPF / Bacteria: Negative    RADIOLOGY & ADDITIONAL TESTS:  Results Reviewed:   Imaging Personally Reviewed:  Electrocardiogram Personally Reviewed:    COORDINATION OF CARE:  Care Discussed with Consultants/Other Providers [Y/N]: RN, SW, CM, ACP re overall care   Prior or Outpatient Records Reviewed [Y/N]:

## 2021-11-22 NOTE — PROGRESS NOTE ADULT - PROBLEM SELECTOR PLAN 1
Plan per :   Patient presents with word finding difficulties and disorientation consistent with dementia. There is no current evidence of depression, jd or psychosis. She is not suicidal, homicidal or violent. There is no acute psychiatric symptoms that would require inpatient psychiatric admission. Patient was referred to anonymously to Adult Protective Services, who met with patient and determined her to be an unsafe discharge due to her cognitive impairment.  [place] and pt has no nearby family or friends that can stay with her presently. Currently would recommend social admission until additional services can be arranged. PT DOES NOT HAVE CAPACITY TO LEAVE AMA.  - was being managed outpatient at [facility] by Dr. Dr. Carlos Fleming [phone], unable to reach over weekend.  - c/w memantine/aricept  - MSW following  - TSH wnl  - SW regarding dispo Patient presents with word finding difficulties and disorientation consistent with dementia. There is no current evidence of depression, jd or psychosis. She is not suicidal, homicidal or violent. There is no acute psychiatric symptoms that would require inpatient psychiatric admission. Patient was referred anonymously to Adult Protective Services, who met with patient and determined her to be an unsafe discharge due to her cognitive impairment.  [place] and pt has no nearby family or friends that can stay with her presently. Currently would recommend social admission until additional services can be arranged. PT DOES NOT HAVE CAPACITY TO LEAVE AMA.  - was being managed outpatient at [facility] by Dr. Dr. Carlos Fleming [phone]  - c/w memantine/aricept  - MSW following  - TSH wnl  - SW regarding dispo

## 2021-11-23 ENCOUNTER — TRANSCRIPTION ENCOUNTER (OUTPATIENT)
Age: 71
End: 2021-11-23

## 2021-11-23 LAB
ANION GAP SERPL CALC-SCNC: 12 MMOL/L — SIGNIFICANT CHANGE UP (ref 7–14)
BUN SERPL-MCNC: 13 MG/DL — SIGNIFICANT CHANGE UP (ref 7–23)
CALCIUM SERPL-MCNC: 9.4 MG/DL — SIGNIFICANT CHANGE UP (ref 8.4–10.5)
CHLORIDE SERPL-SCNC: 105 MMOL/L — SIGNIFICANT CHANGE UP (ref 98–107)
CO2 SERPL-SCNC: 24 MMOL/L — SIGNIFICANT CHANGE UP (ref 22–31)
COVID-19 NUCLEOCAPSID GAM AB INTERP: NEGATIVE — SIGNIFICANT CHANGE UP
COVID-19 NUCLEOCAPSID TOTAL GAM ANTIBODY RESULT: 0.08 INDEX — SIGNIFICANT CHANGE UP
CREAT SERPL-MCNC: 0.69 MG/DL — SIGNIFICANT CHANGE UP (ref 0.5–1.3)
GLUCOSE SERPL-MCNC: 108 MG/DL — HIGH (ref 70–99)
HCT VFR BLD CALC: 40.1 % — SIGNIFICANT CHANGE UP (ref 34.5–45)
HGB BLD-MCNC: 13.2 G/DL — SIGNIFICANT CHANGE UP (ref 11.5–15.5)
MAGNESIUM SERPL-MCNC: 2.1 MG/DL — SIGNIFICANT CHANGE UP (ref 1.6–2.6)
MCHC RBC-ENTMCNC: 30.1 PG — SIGNIFICANT CHANGE UP (ref 27–34)
MCHC RBC-ENTMCNC: 32.9 GM/DL — SIGNIFICANT CHANGE UP (ref 32–36)
MCV RBC AUTO: 91.6 FL — SIGNIFICANT CHANGE UP (ref 80–100)
NRBC # BLD: 0 /100 WBCS — SIGNIFICANT CHANGE UP
NRBC # FLD: 0 K/UL — SIGNIFICANT CHANGE UP
PHOSPHATE SERPL-MCNC: 3.4 MG/DL — SIGNIFICANT CHANGE UP (ref 2.5–4.5)
PLATELET # BLD AUTO: 179 K/UL — SIGNIFICANT CHANGE UP (ref 150–400)
POTASSIUM SERPL-MCNC: 4.1 MMOL/L — SIGNIFICANT CHANGE UP (ref 3.5–5.3)
POTASSIUM SERPL-SCNC: 4.1 MMOL/L — SIGNIFICANT CHANGE UP (ref 3.5–5.3)
RBC # BLD: 4.38 M/UL — SIGNIFICANT CHANGE UP (ref 3.8–5.2)
RBC # FLD: 13.3 % — SIGNIFICANT CHANGE UP (ref 10.3–14.5)
SARS-COV-2 IGG+IGM SERPL QL IA: 0.08 INDEX — SIGNIFICANT CHANGE UP
SARS-COV-2 IGG+IGM SERPL QL IA: NEGATIVE — SIGNIFICANT CHANGE UP
SODIUM SERPL-SCNC: 141 MMOL/L — SIGNIFICANT CHANGE UP (ref 135–145)
WBC # BLD: 7.06 K/UL — SIGNIFICANT CHANGE UP (ref 3.8–10.5)
WBC # FLD AUTO: 7.06 K/UL — SIGNIFICANT CHANGE UP (ref 3.8–10.5)

## 2021-11-23 PROCEDURE — 99231 SBSQ HOSP IP/OBS SF/LOW 25: CPT

## 2021-11-23 RX ORDER — CHOLECALCIFEROL (VITAMIN D3) 125 MCG
1000 CAPSULE ORAL DAILY
Refills: 0 | Status: DISCONTINUED | OUTPATIENT
Start: 2021-11-23 | End: 2021-11-24

## 2021-11-23 RX ADMIN — DONEPEZIL HYDROCHLORIDE 5 MILLIGRAM(S): 10 TABLET, FILM COATED ORAL at 09:40

## 2021-11-23 RX ADMIN — MEMANTINE HYDROCHLORIDE 5 MILLIGRAM(S): 10 TABLET ORAL at 18:06

## 2021-11-23 RX ADMIN — ENOXAPARIN SODIUM 40 MILLIGRAM(S): 100 INJECTION SUBCUTANEOUS at 11:21

## 2021-11-23 RX ADMIN — Medication 10 MILLIGRAM(S): at 18:06

## 2021-11-23 RX ADMIN — DONEPEZIL HYDROCHLORIDE 5 MILLIGRAM(S): 10 TABLET, FILM COATED ORAL at 20:08

## 2021-11-23 RX ADMIN — Medication 10 MILLIGRAM(S): at 05:26

## 2021-11-23 RX ADMIN — MEMANTINE HYDROCHLORIDE 5 MILLIGRAM(S): 10 TABLET ORAL at 05:26

## 2021-11-23 RX ADMIN — Medication 1000 UNIT(S): at 18:07

## 2021-11-23 RX ADMIN — Medication 10 MILLIGRAM(S): at 11:20

## 2021-11-23 NOTE — PROGRESS NOTE ADULT - NSPROGADDITIONALINFOA_GEN_ALL_CORE
With CM, spoke with  by phone, Dr. Artie Monterroso (previously internal med MD, currently works in business field).  He has been working with [facility] neurologist, pt likely with dementia, has neuropsych testing on 2/1/21.  Pt lives in [place], does [employer], drives to the diner, uses her credit card appropriately.  Has a daughter, who is a physician in [place].  not due to return to [place] until [place] 1st.  Advised  hire privately an aide, referred to [facility].  - left message on 's cell today, as did KIM, await call back

## 2021-11-23 NOTE — DISCHARGE NOTE PROVIDER - NSDCCPCAREPLAN_GEN_ALL_CORE_FT
PRINCIPAL DISCHARGE DIAGNOSIS  Diagnosis: Dementia  Assessment and Plan of Treatment: Please continue your medications as prescribed and allow help with daily acitivities of living. Maintain a safe environment and make movements in a careful manner to prevent falls. Ensure that you are eating and drinking adequately and maintaining a healthy sleep cycle.   If you are in need of assistance with medication adjustment you can follow-up with your outpatient provider or refer to the [facility] clinic by calling [phone].

## 2021-11-23 NOTE — PROGRESS NOTE ADULT - PROBLEM SELECTOR PLAN 1
Patient presents with word finding difficulties and disorientation consistent with dementia. There is no current evidence of depression, jd or psychosis. She is not suicidal, homicidal or violent. There is no acute psychiatric symptoms that would require inpatient psychiatric admission. Patient was referred anonymously to Adult Protective Services, who met with patient and determined her to be an unsafe discharge due to her cognitive impairment.  [place] and pt has no nearby family or friends that can stay with her presently. Currently would recommend social admission until additional services can be arranged.   - was being managed outpatient at [facility] by Dr. Dr. Carlos Fleming [phone]  - c/w memantine/aricept  - TSH wnl  - CM helping coordinate with  re safe d/c planning

## 2021-11-23 NOTE — PROGRESS NOTE ADULT - SUBJECTIVE AND OBJECTIVE BOX
[facility] Division of Hospital Medicine  Franny Cox MD  Pager (M-F, 8A-5P):  In-house pager [phone]; Long-range pager [phone]  Other Times:  Please page Hospitalist in Charge -  In-house pager [phone]    Patient is a 71y old  Female who presents with a chief complaint of Poor recall (23 Nov 2021 12:07)    SUBJECTIVE / OVERNIGHT EVENTS:   No problems reported over night. Seen earlier, resting in bed, no complaints.  Says her  is coming to pick her up tomorrow.  ADDITIONAL REVIEW OF SYSTEMS:    MEDICATIONS  (STANDING):  busPIRone 10 milliGRAM(s) Oral two times a day  cholecalciferol 1000 Unit(s) Oral daily  donepezil 5 milliGRAM(s) Oral <User Schedule>  enoxaparin Injectable 40 milliGRAM(s) SubCutaneous daily  FLUoxetine 10 milliGRAM(s) Oral daily  memantine 5 milliGRAM(s) Oral two times a day    MEDICATIONS  (PRN):  acetaminophen     Tablet .. 650 milliGRAM(s) Oral every 6 hours PRN Temp greater or equal to 38C (100.4F), Mild Pain (1 - 3)  aluminum hydroxide/magnesium hydroxide/simethicone Suspension 30 milliLiter(s) Oral every 4 hours PRN Dyspepsia  melatonin 3 milliGRAM(s) Oral at bedtime PRN Insomnia  OLANZapine Injectable 1.25 milliGRAM(s) IntraMuscular every 6 hours PRN SEVERE Agitation  ondansetron Injectable 4 milliGRAM(s) IV Push every 8 hours PRN Nausea and/or Vomiting  QUEtiapine 12.5 milliGRAM(s) Oral every 6 hours PRN Anxiety or agitation    PHYSICAL EXAM:  Vital Signs Last 24 Hrs  T(C): 36.9 (23 Nov 2021 10:59), Max: 37 (22 Nov 2021 20:19)  T(F): 98.4 (23 Nov 2021 10:59), Max: 98.6 (22 Nov 2021 20:19)  HR: 62 (23 Nov 2021 10:59) (61 - 62)  BP: 130/77 (23 Nov 2021 10:59) (107/66 - 130/77)  BP(mean): --  RR: 17 (23 Nov 2021 10:59) (17 - 18)  SpO2: 98% (23 Nov 2021 10:59) (97% - 98%)    CONSTITUTIONAL: NAD, resting in bed  RESPIRATORY: Normal respiratory effort; lungs are clear to auscultation bilaterally  CARDIOVASCULAR: Regular rate and rhythm, normal S1 and S2, no murmur/rub/gallop; No lower extremity edema; Peripheral pulses are 2+ bilaterally  ABDOMEN: Nontender to palpation, normoactive bowel sounds, no rebound/guarding  MUSCLOSKELETAL:  Normal gait; no clubbing or cyanosis of digits; no joint swelling or tenderness to palpation  PSYCH: A+O to person, place, and time; affect appropriate  NEUROLOGY: CN 2-12 are intact and symmetric; no gross sensory deficits;   SKIN: No rashes; no palpable lesions    LABS:                        13.2   7.06  )-----------( 179      ( 23 Nov 2021 06:49 )             40.1     11-23    141  |  105  |  13  ----------------------------<  108<H>  4.1   |  24  |  0.69    Ca    9.4      23 Nov 2021 06:49  Phos  3.4     11-23  Mg     2.10     11-23    RADIOLOGY & ADDITIONAL TESTS:  Results Reviewed:   Imaging Personally Reviewed:  Electrocardiogram Personally Reviewed:    COORDINATION OF CARE:  Care Discussed with Consultants/Other Providers [Y/N]: RN, SW, CM, ACP re overall care   Prior or Outpatient Records Reviewed [Y/N]:

## 2021-11-23 NOTE — DISCHARGE NOTE PROVIDER - HOSPITAL COURSE
71F h/o dementia and anxiety found wandering into a police station    Cognitive disorder.   - Patient presents with word finding difficulties and disorientation consistent w/ dementia  - Patient was referred to anonymously to APS determined her to be an unsafe discharge due to her cognitive impairment.  is [place] and pt has no nearby family or friends that can stay with her presently.   - DOES NOT HAVE CAPACITY TO LEAVE AMA.  - Outpatient at [facility] by Dr. Carlos Fleming [phone]  - C/w Memantine/Aricept  - TSH WNL; Vitamin D LOW (Started supplementation), B12, folate WNL  - UA negative    Anxiety.   - Continue Buspar and Zyprexa 1.25 PO/IM recommended by .  - Ordered for Prozac, per  patient should not be taking prozac as worsens symptoms regarding dementia, asked behavioral health for f/u as patient expressing depressive symptoms, no active SI    Dispo - 71F h/o dementia and anxiety found wandering into a police station    Cognitive disorder  - Patient presents with word finding difficulties and disorientation consistent w/ dementia  - Patient was referred to anonymously to APS determined her to be an unsafe discharge due to her cognitive impairment.  [place] and pt has no nearby family or friends that can stay with her presently.   - DOES NOT HAVE CAPACITY TO LEAVE AMA.  - Outpatient at [facility] by Dr. Carlos Fleming [phone]  - C/w Memantine/Aricept  - TSH WNL; Vitamin D LOW (Started supplementation), B12, folate WNL  - UA negative    Anxiety.   - Continue Buspar and Zyprexa 1.25 PO/IM recommended by .  - Ordered for Prozac, per  patient should not be taking prozac as worsens symptoms regarding dementia, asked behavioral health for f/u as patient expressing depressive symptoms, no active SI    Dispo - Home to  be picked up by ; Case discussed w/ Dr. Cox 71F h/o dementia and anxiety found wandering into a police station    Cognitive disorder - Patient presents with word finding difficulties and disorientation consistent w/ dementia. Patient was referred to anonymously to APS determined her to be an unsafe discharge due to her cognitive impairment.  [place] and pt has no nearby family or friends that can stay with her presently.   - Outpatient at [facility] by Dr. Carlos Fleming [phone]  - C/w Memantine/Aricept  - TSH WNL; Vitamin D LOW (Started supplementation), B12, folate WNL  - UA negative    Anxiety.  Continue Buspar, prozac;  Zyprexa PRN (none required) recommended by .    Dispo - Home to  be picked up by

## 2021-11-23 NOTE — DISCHARGE NOTE PROVIDER - NSDCMRMEDTOKEN_GEN_ALL_CORE_FT
BuSpar 10 mg oral tablet: 1 tab(s) orally 2 times a day  donepezil 5 mg oral tablet: 1 tab(s) orally 2 times a day  donepezil 5 mg oral tablet: 1 tab(s) orally once a day (at bedtime)  folic acid 1 mg oral tablet: 1 tab(s) orally once a day  melatonin 5 mg oral tablet: 1 tab(s) orally once a day (at bedtime)  memantine 5 mg oral tablet: 1 tab(s) orally 2 times a day  Multiple Vitamins oral tablet: 1 tab(s) orally once a day  PROzac 10 mg oral tablet: 1 tab(s) orally once a day   BuSpar 10 mg oral tablet: 1 tab(s) orally 2 times a day  cholecalciferol oral tablet: 1000 unit(s) orally once a day  donepezil 5 mg oral tablet: 1 tab(s) orally 2 times a day  folic acid 1 mg oral tablet: 1 tab(s) orally once a day  melatonin 5 mg oral tablet: 1 tab(s) orally once a day (at bedtime)  memantine 5 mg oral tablet: 1 tab(s) orally 2 times a day  Multiple Vitamins oral tablet: 1 tab(s) orally once a day  PROzac 10 mg oral tablet: 1 tab(s) orally once a day

## 2021-11-24 ENCOUNTER — TRANSCRIPTION ENCOUNTER (OUTPATIENT)
Age: 71
End: 2021-11-24

## 2021-11-24 VITALS
HEART RATE: 65 BPM | DIASTOLIC BLOOD PRESSURE: 82 MMHG | RESPIRATION RATE: 17 BRPM | SYSTOLIC BLOOD PRESSURE: 133 MMHG | TEMPERATURE: 98 F | OXYGEN SATURATION: 99 %

## 2021-11-24 PROCEDURE — 99239 HOSP IP/OBS DSCHRG MGMT >30: CPT

## 2021-11-24 RX ORDER — CHOLECALCIFEROL (VITAMIN D3) 125 MCG
1000 CAPSULE ORAL
Qty: 0 | Refills: 0 | DISCHARGE
Start: 2021-11-24

## 2021-11-24 RX ADMIN — ENOXAPARIN SODIUM 40 MILLIGRAM(S): 100 INJECTION SUBCUTANEOUS at 11:05

## 2021-11-24 RX ADMIN — Medication 1000 UNIT(S): at 11:05

## 2021-11-24 RX ADMIN — Medication 10 MILLIGRAM(S): at 11:05

## 2021-11-24 RX ADMIN — Medication 10 MILLIGRAM(S): at 06:09

## 2021-11-24 RX ADMIN — MEMANTINE HYDROCHLORIDE 5 MILLIGRAM(S): 10 TABLET ORAL at 06:09

## 2021-11-24 RX ADMIN — DONEPEZIL HYDROCHLORIDE 5 MILLIGRAM(S): 10 TABLET, FILM COATED ORAL at 10:13

## 2021-11-24 NOTE — PROGRESS NOTE ADULT - PROBLEM SELECTOR PLAN 2
Continue Buspar and Zyprexa 1.25 po / IM recommended by .  Ordered for Prozac, per  patient should not be taking prozac as worsens symptoms regarding dementia, asked behavioral health for f/u as patient expressing depressive symptoms, no active SI. Will see Monday, c/w at this time given improvement in patient's mentation. Buspar 10mg BID, Donepezil 5mg BID, Memantine 5mg BID, Prozac 10mg daily. PRN Zyprexa 1.25mg PO/IM severe agitation

## 2021-11-24 NOTE — PROGRESS NOTE ADULT - PROBLEM SELECTOR PLAN 1
Patient presents with word finding difficulties and disorientation consistent with dementia. There is no current evidence of depression, jd or psychosis. She is not suicidal, homicidal or violent. There is no acute psychiatric symptoms that would require inpatient psychiatric admission. Patient was referred anonymously to Adult Protective Services, who met with patient and determined her to be an unsafe discharge due to her cognitive impairment.  is [place] and pt has no nearby family or friends that can stay with her presently. Currently would recommend social admission until additional services can be arranged.   - was being managed outpatient at [facility] by Dr. Dr. Carlos Fleming [phone]  - c/w memantine/aricept  - TSH wnl  - CM helping coordinate with  re safe d/c planning

## 2021-11-24 NOTE — PROGRESS NOTE ADULT - PROBLEM SELECTOR PLAN 3
Continue Aricept and Namenda.

## 2021-11-24 NOTE — PROGRESS NOTE ADULT - PROBLEM SELECTOR PLAN 4
VTE with Lovenox 40 mg sub cut daily.  Fall , Aspiration, safety and seizure precautions.  BOYD and LISBET milian.
VTE with Lovenox 40 mg sub cut daily.  Fall , Aspiration, safety and seizure precautions.  OBYD and LISBET milian.

## 2021-11-24 NOTE — DISCHARGE NOTE NURSING/CASE MANAGEMENT/SOCIAL WORK - NSDCVIVACCINE_GEN_ALL_CORE_FT
Tdap; 10-Mar-2020 18:20; Cheyenne Galo (RN); [employer]; [id number] (Exp. Date: 15-Aug-2021); IntraMuscular; Deltoid Left.; 0.5 milliLiter(s); VIS (VIS Published: 09-May-2013, VIS Presented: 10-Mar-2020);

## 2021-11-24 NOTE — PROGRESS NOTE ADULT - NSPROGADDITIONALINFOA_GEN_ALL_CORE
With CM, spoke with  by phone, Dr. Artie Monterroso (previously internal med MD, currently works in business field).  He has been working with [facility] neurologist, pt likely with dementia, has neuropsych testing on 2/1/21.  Pt lives in [place], does [employer] (?????), drives to the diner, uses her credit card appropriately.  Has a daughter, who is a physician in [place].  not [place] return to [place] until [place] 1st.  Advised  hire privately an aide, referred to [facility].  - left message on 's cell today, as did KIM, await call back With CM, spoke with  by phone, Dr. Artie Monterroso (previously internal med MD, currently works in business field).  He has been working with [facility] neurologist, pt likely with dementia, has neuropsych testing on 2/1/21.  Pt lives in [place], does [employer], drives to the diner, uses her credit card appropriately.  Has a daughter, who is a physician in [place].  not due to return to [place] until [place] 1st.  Advised  hire privately an aide, referred to [facility].    Team reached , flying home today, will pick pt up later.    Spent 35 minutes counseling and coordinating discharge care.

## 2021-11-24 NOTE — PROGRESS NOTE ADULT - SUBJECTIVE AND OBJECTIVE BOX
[facility] Division of Hospital Medicine  Franny Cox MD  Pager (M-F, 8A-5P):  In-house pager [phone]; Long-range pager [phone]  Other Times:  Please page Hospitalist in Charge -  In-house pager [phone]    Patient is a 71y old  Female who presents with a chief complaint of Poor recall (23 Nov 2021 18:57)    SUBJECTIVE / OVERNIGHT EVENTS:   ....  ADDITIONAL REVIEW OF SYSTEMS:    MEDICATIONS  (STANDING):  busPIRone 10 milliGRAM(s) Oral two times a day  cholecalciferol 1000 Unit(s) Oral daily  donepezil 5 milliGRAM(s) Oral <User Schedule>  enoxaparin Injectable 40 milliGRAM(s) SubCutaneous daily  FLUoxetine 10 milliGRAM(s) Oral daily  memantine 5 milliGRAM(s) Oral two times a day    MEDICATIONS  (PRN):  acetaminophen     Tablet .. 650 milliGRAM(s) Oral every 6 hours PRN Temp greater or equal to 38C (100.4F), Mild Pain (1 - 3)  aluminum hydroxide/magnesium hydroxide/simethicone Suspension 30 milliLiter(s) Oral every 4 hours PRN Dyspepsia  melatonin 3 milliGRAM(s) Oral at bedtime PRN Insomnia  OLANZapine Injectable 1.25 milliGRAM(s) IntraMuscular every 6 hours PRN SEVERE Agitation  ondansetron Injectable 4 milliGRAM(s) IV Push every 8 hours PRN Nausea and/or Vomiting  QUEtiapine 12.5 milliGRAM(s) Oral every 6 hours PRN Anxiety or agitation    PHYSICAL EXAM:  Vital Signs Last 24 Hrs  T(C): 36.8 (24 Nov 2021 06:04), Max: 37 (23 Nov 2021 22:53)  T(F): 98.2 (24 Nov 2021 06:04), Max: 98.6 (23 Nov 2021 22:53)  HR: 61 (24 Nov 2021 06:04) (61 - 68)  BP: 117/69 (24 Nov 2021 06:04) (117/69 - 130/77)  BP(mean): --  RR: 16 (24 Nov 2021 06:04) (16 - 17)  SpO2: 100% (24 Nov 2021 06:04) (98% - 100%)    CONSTITUTIONAL: NAD, resting in bed  RESPIRATORY: Normal respiratory effort; lungs are clear to auscultation bilaterally  CARDIOVASCULAR: Regular rate and rhythm, normal S1 and S2, no murmur/rub/gallop; No lower extremity edema; Peripheral pulses are 2+ bilaterally  ABDOMEN: Nontender to palpation, normoactive bowel sounds, no rebound/guarding  MUSCLOSKELETAL:  Normal gait; no clubbing or cyanosis of digits; no joint swelling or tenderness to palpation  PSYCH: A+O to person, place, and time; affect appropriate  NEUROLOGY: CN 2-12 are intact and symmetric; no gross sensory deficits;   SKIN: No rashes; no palpable lesions    LABS:                        13.2   7.06  )-----------( 179      ( 23 Nov 2021 06:49 )             40.1     11-23    141  |  105  |  13  ----------------------------<  108<H>  4.1   |  24  |  0.69    Ca    9.4      23 Nov 2021 06:49  Phos  3.4     11-23  Mg     2.10     11-23    RADIOLOGY & ADDITIONAL TESTS:  Results Reviewed:   Imaging Personally Reviewed:  Electrocardiogram Personally Reviewed:    COORDINATION OF CARE:  Care Discussed with Consultants/Other Providers [Y/N]: RN, SW, CM, ACP re overall care   Prior or Outpatient Records Reviewed [Y/N]:   [facility] Division of Hospital Medicine  Franny Cox MD  Pager (M-F, 8A-5P):  In-house pager [phone]; Long-range pager [phone]  Other Times:  Please page Hospitalist in Charge -  In-house pager [phone]    Patient is a 71y old  Female who presents with a chief complaint of Poor recall (23 Nov 2021 18:57)    SUBJECTIVE / OVERNIGHT EVENTS:   Team reached , flying home today, will pick pt up later.  No problems reported over night. Pt remains calm, no complaints.  ADDITIONAL REVIEW OF SYSTEMS:    MEDICATIONS  (STANDING):  busPIRone 10 milliGRAM(s) Oral two times a day  cholecalciferol 1000 Unit(s) Oral daily  donepezil 5 milliGRAM(s) Oral <User Schedule>  enoxaparin Injectable 40 milliGRAM(s) SubCutaneous daily  FLUoxetine 10 milliGRAM(s) Oral daily  memantine 5 milliGRAM(s) Oral two times a day    MEDICATIONS  (PRN):  acetaminophen     Tablet .. 650 milliGRAM(s) Oral every 6 hours PRN Temp greater or equal to 38C (100.4F), Mild Pain (1 - 3)  aluminum hydroxide/magnesium hydroxide/simethicone Suspension 30 milliLiter(s) Oral every 4 hours PRN Dyspepsia  melatonin 3 milliGRAM(s) Oral at bedtime PRN Insomnia  OLANZapine Injectable 1.25 milliGRAM(s) IntraMuscular every 6 hours PRN SEVERE Agitation  ondansetron Injectable 4 milliGRAM(s) IV Push every 8 hours PRN Nausea and/or Vomiting  QUEtiapine 12.5 milliGRAM(s) Oral every 6 hours PRN Anxiety or agitation    PHYSICAL EXAM:  Vital Signs Last 24 Hrs  T(C): 36.8 (24 Nov 2021 06:04), Max: 37 (23 Nov 2021 22:53)  T(F): 98.2 (24 Nov 2021 06:04), Max: 98.6 (23 Nov 2021 22:53)  HR: 61 (24 Nov 2021 06:04) (61 - 68)  BP: 117/69 (24 Nov 2021 06:04) (117/69 - 130/77)  BP(mean): --  RR: 16 (24 Nov 2021 06:04) (16 - 17)  SpO2: 100% (24 Nov 2021 06:04) (98% - 100%)    CONSTITUTIONAL: NAD, resting in bed  RESPIRATORY: Normal respiratory effort; lungs are clear to auscultation bilaterally  CARDIOVASCULAR: Regular rate and rhythm, normal S1 and S2, no murmur/rub/gallop; No lower extremity edema; Peripheral pulses are 2+ bilaterally  ABDOMEN: Nontender to palpation, normoactive bowel sounds, no rebound/guarding  MUSCLOSKELETAL:  Normal gait; no clubbing or cyanosis of digits; no joint swelling or tenderness to palpation  PSYCH: A+O to person, place, and time; affect appropriate  NEUROLOGY: CN 2-12 are intact and symmetric; no gross sensory deficits;   SKIN: No rashes; no palpable lesions    LABS:                        13.2   7.06  )-----------( 179      ( 23 Nov 2021 06:49 )             40.1     11-23    141  |  105  |  13  ----------------------------<  108<H>  4.1   |  24  |  0.69    Ca    9.4      23 Nov 2021 06:49  Phos  3.4     11-23  Mg     2.10     11-23    RADIOLOGY & ADDITIONAL TESTS:  Results Reviewed:   Imaging Personally Reviewed:  Electrocardiogram Personally Reviewed:    COORDINATION OF CARE:  Care Discussed with Consultants/Other Providers [Y/N]: RN, SW, CM, ACP re overall care   Prior or Outpatient Records Reviewed [Y/N]:

## 2021-11-24 NOTE — DISCHARGE NOTE NURSING/CASE MANAGEMENT/SOCIAL WORK - PATIENT PORTAL LINK FT
You can access the FollowMyHealth Patient Portal offered by [facility] by registering at the following website: [email or web address]. By joining [facility]’s FollowMyHealth portal, you will also be able to view your health information using other applications (apps) compatible with our system.

## 2021-11-24 NOTE — PROGRESS NOTE ADULT - ASSESSMENT
71F, history of dementia and anxiety brought to the Ed after wandering into a police station 6:43 am on 11/19. 
71F, history of dementia and anxiety brought to the Ed after wandering into a police station 
71F, history of dementia and anxiety brought to the ED after wandering into a police station 
71F, history of dementia and anxiety brought to the Ed after wandering into a police station 6:43 am on 11/19. 
71F, history of dementia and anxiety brought to the Ed after wandering into a police station 6:43 am on 11/19.

## 2022-08-15 NOTE — ED BEHAVIORAL HEALTH ASSESSMENT NOTE - BEHAVIOR
84531/1     Davida Agudelo MRN: [id number]  AGE: 74 year old  ADMIT DATE: 8/11/2022    CODE STATUS: Full Resuscitation  ISOLATION STATUS: No active isolations   DIET: Tube Feeding Diet Jevity 1.2 Calorie/1.2 Calorie Formula - With Fiber; Plus Diet Tray; Fiber Restricted (residue Low)    ALLERGIES:  Penicillins, Chocolate   (food or med), Peanut oil   (food or med), Shellfish allergy   (food or med), Sulfa antibiotics, and Sulfur     DX:Peritonitis (CMS/HCC)  (primary encounter diagnosis)     Att: Jakub Jones MD  PCP: Saúl Jordan MD  IP Consult Orders (From admission, onward)             Start     Ordered    08/12/22 0748  Inpatient Consult to Interventional Radiology  ONE TIME        Provider:  Rebeca De La Torre CNP    08/12/22 0750    08/11/22 1856  Inpatient consult to Physician  ONE TIME        Provider:  Kristy Haddad MD    08/11/22 1856    08/11/22 1529  Inpatient consult to Infectious Diseases  ONE TIME        Provider:  Florentino Pascual MD    08/11/22 1528    08/11/22 1529  Inpatient consult to General Surgery  ONE TIME        Provider:  Benedict Vaca MD    08/11/22 1529                    BP: 127/78  Temp: 98.4 °F (36.9 °C)  Temp src: Oral  Heart Rate: (!) 115  Resp: 16  SpO2: 94 %  Height: 5' 4\" (162.6 cm)  Weight: 54.5 kg (120 lb 2.4 oz)   Weight change:      No results available in last 24 hours     Creatinine (mg/dL)   Date Value   08/12/2022 0.54   08/11/2022 0.47 (L)     PTT (sec)   Date Value   08/11/2022 23     INR (no units)   Date Value   08/11/2022 1.0     WBC (K/mcL)   Date Value   08/14/2022 12.6 (H)   08/12/2022 7.4        I/O last 3 completed shifts:  In: 2291.7 [P.O.:480; I.V.:59; NG/GT:1454; IV Piggyback:298.8]  Out: 885 [Urine:575; Drains:310]                         IMPORTANT EVENTS THIS SHIFT:  Patient has some abdominal distention.  Tube feeding was stopped during the previous shift and the patient started feeling better.  During night shift surgeon was called and an  order of fluid was obtained (the patient is on soft diet but not drinking enough liquid, also HR is elevated).  Morphine has been given for pain with relief.  No fever during the night shift.  SBA.  SCD'S on.  All needs met. IMPORTANT EVENTS COMING UP/GOALS (PLEASE INCLUDE WHITE BOARD AND DISCHARGE BOARD UPDATES):       PATIENT SPECIAL NEEDS/ACCOMMODATIONS:                                  Cooperative

## 2022-12-28 ENCOUNTER — TRANSCRIPTION ENCOUNTER (OUTPATIENT)
Age: 72
End: 2022-12-28

## 2022-12-28 ENCOUNTER — INPATIENT (INPATIENT)
Facility: HOSPITAL | Age: 72
LOS: 0 days | Discharge: HOME | DRG: 57 | End: 2022-12-28
Attending: HOSPITALIST | Admitting: HOSPITALIST
Payer: COMMERCIAL

## 2022-12-28 VITALS
DIASTOLIC BLOOD PRESSURE: 98 MMHG | OXYGEN SATURATION: 98 % | HEART RATE: 94 BPM | RESPIRATION RATE: 18 BRPM | TEMPERATURE: 98 F | SYSTOLIC BLOOD PRESSURE: 148 MMHG

## 2022-12-28 VITALS
TEMPERATURE: 99 F | SYSTOLIC BLOOD PRESSURE: 150 MMHG | HEART RATE: 68 BPM | OXYGEN SATURATION: 98 % | HEIGHT: 66 IN | DIASTOLIC BLOOD PRESSURE: 84 MMHG | WEIGHT: 149.91 LBS | RESPIRATION RATE: 20 BRPM

## 2022-12-28 DIAGNOSIS — G30.9 ALZHEIMER'S DISEASE, UNSPECIFIED: ICD-10-CM

## 2022-12-28 DIAGNOSIS — Z90.710 ACQUIRED ABSENCE OF BOTH CERVIX AND UTERUS: Chronic | ICD-10-CM

## 2022-12-28 PROBLEM — F03.90 UNSPECIFIED DEMENTIA, UNSPECIFIED SEVERITY, WITHOUT BEHAVIORAL DISTURBANCE, PSYCHOTIC DISTURBANCE, MOOD DISTURBANCE, AND ANXIETY: Chronic | Status: ACTIVE | Noted: 2021-11-19

## 2022-12-28 PROBLEM — F41.9 ANXIETY DISORDER, UNSPECIFIED: Chronic | Status: ACTIVE | Noted: 2021-11-19

## 2022-12-28 LAB
ALBUMIN SERPL ELPH-MCNC: 4.3 G/DL — SIGNIFICANT CHANGE UP (ref 3.3–5)
ALP SERPL-CCNC: 76 U/L — SIGNIFICANT CHANGE UP (ref 40–120)
ALT FLD-CCNC: 21 U/L — SIGNIFICANT CHANGE UP (ref 10–45)
AMMONIA BLD-MCNC: 19 UMOL/L — SIGNIFICANT CHANGE UP (ref 11–55)
ANION GAP SERPL CALC-SCNC: 10 MMOL/L — SIGNIFICANT CHANGE UP (ref 5–17)
APAP SERPL-MCNC: <15 UG/ML — SIGNIFICANT CHANGE UP (ref 10–30)
APPEARANCE UR: ABNORMAL
AST SERPL-CCNC: 28 U/L — SIGNIFICANT CHANGE UP (ref 10–40)
BACTERIA # UR AUTO: NEGATIVE — SIGNIFICANT CHANGE UP
BASOPHILS # BLD AUTO: 0.04 K/UL — SIGNIFICANT CHANGE UP (ref 0–0.2)
BASOPHILS NFR BLD AUTO: 0.6 % — SIGNIFICANT CHANGE UP (ref 0–2)
BILIRUB SERPL-MCNC: 1.4 MG/DL — HIGH (ref 0.2–1.2)
BILIRUB UR-MCNC: NEGATIVE — SIGNIFICANT CHANGE UP
BUN SERPL-MCNC: 15 MG/DL — SIGNIFICANT CHANGE UP (ref 7–23)
CALCIUM SERPL-MCNC: 9.4 MG/DL — SIGNIFICANT CHANGE UP (ref 8.4–10.5)
CHLORIDE SERPL-SCNC: 104 MMOL/L — SIGNIFICANT CHANGE UP (ref 96–108)
CO2 SERPL-SCNC: 25 MMOL/L — SIGNIFICANT CHANGE UP (ref 22–31)
COLOR SPEC: SIGNIFICANT CHANGE UP
CREAT SERPL-MCNC: 0.66 MG/DL — SIGNIFICANT CHANGE UP (ref 0.5–1.3)
DIFF PNL FLD: NEGATIVE — SIGNIFICANT CHANGE UP
EGFR: 93 ML/MIN/1.73M2 — SIGNIFICANT CHANGE UP
EOSINOPHIL # BLD AUTO: 0.04 K/UL — SIGNIFICANT CHANGE UP (ref 0–0.5)
EOSINOPHIL NFR BLD AUTO: 0.6 % — SIGNIFICANT CHANGE UP (ref 0–6)
EPI CELLS # UR: 2 /HPF — SIGNIFICANT CHANGE UP
ETHANOL SERPL-MCNC: <10 MG/DL — SIGNIFICANT CHANGE UP (ref 0–10)
FLUAV AG NPH QL: SIGNIFICANT CHANGE UP
FLUBV AG NPH QL: SIGNIFICANT CHANGE UP
GLUCOSE SERPL-MCNC: 105 MG/DL — HIGH (ref 70–99)
GLUCOSE UR QL: NEGATIVE — SIGNIFICANT CHANGE UP
HCT VFR BLD CALC: 37.5 % — SIGNIFICANT CHANGE UP (ref 34.5–45)
HGB BLD-MCNC: 11.9 G/DL — SIGNIFICANT CHANGE UP (ref 11.5–15.5)
HYALINE CASTS # UR AUTO: 1 /LPF — SIGNIFICANT CHANGE UP (ref 0–2)
IMM GRANULOCYTES NFR BLD AUTO: 0.5 % — SIGNIFICANT CHANGE UP (ref 0–0.9)
KETONES UR-MCNC: NEGATIVE — SIGNIFICANT CHANGE UP
LEUKOCYTE ESTERASE UR-ACNC: ABNORMAL
LYMPHOCYTES # BLD AUTO: 1.21 K/UL — SIGNIFICANT CHANGE UP (ref 1–3.3)
LYMPHOCYTES # BLD AUTO: 18.3 % — SIGNIFICANT CHANGE UP (ref 13–44)
MAGNESIUM SERPL-MCNC: 2.1 MG/DL — SIGNIFICANT CHANGE UP (ref 1.6–2.6)
MCHC RBC-ENTMCNC: 29.3 PG — SIGNIFICANT CHANGE UP (ref 27–34)
MCHC RBC-ENTMCNC: 31.7 GM/DL — LOW (ref 32–36)
MCV RBC AUTO: 92.4 FL — SIGNIFICANT CHANGE UP (ref 80–100)
MONOCYTES # BLD AUTO: 0.51 K/UL — SIGNIFICANT CHANGE UP (ref 0–0.9)
MONOCYTES NFR BLD AUTO: 7.7 % — SIGNIFICANT CHANGE UP (ref 2–14)
NEUTROPHILS # BLD AUTO: 4.78 K/UL — SIGNIFICANT CHANGE UP (ref 1.8–7.4)
NEUTROPHILS NFR BLD AUTO: 72.3 % — SIGNIFICANT CHANGE UP (ref 43–77)
NITRITE UR-MCNC: NEGATIVE — SIGNIFICANT CHANGE UP
NRBC # BLD: 0 /100 WBCS — SIGNIFICANT CHANGE UP (ref 0–0)
PH UR: 7.5 — SIGNIFICANT CHANGE UP (ref 5–8)
PLATELET # BLD AUTO: 294 K/UL — SIGNIFICANT CHANGE UP (ref 150–400)
POTASSIUM SERPL-MCNC: 4.5 MMOL/L — SIGNIFICANT CHANGE UP (ref 3.5–5.3)
POTASSIUM SERPL-SCNC: 4.5 MMOL/L — SIGNIFICANT CHANGE UP (ref 3.5–5.3)
PROT SERPL-MCNC: 6.8 G/DL — SIGNIFICANT CHANGE UP (ref 6–8.3)
PROT UR-MCNC: SIGNIFICANT CHANGE UP
RBC # BLD: 4.06 M/UL — SIGNIFICANT CHANGE UP (ref 3.8–5.2)
RBC # FLD: 13.2 % — SIGNIFICANT CHANGE UP (ref 10.3–14.5)
RBC CASTS # UR COMP ASSIST: 3 /HPF — SIGNIFICANT CHANGE UP (ref 0–4)
RSV RNA NPH QL NAA+NON-PROBE: SIGNIFICANT CHANGE UP
SARS-COV-2 RNA SPEC QL NAA+PROBE: SIGNIFICANT CHANGE UP
SODIUM SERPL-SCNC: 139 MMOL/L — SIGNIFICANT CHANGE UP (ref 135–145)
SP GR SPEC: 1.02 — SIGNIFICANT CHANGE UP (ref 1.01–1.02)
UROBILINOGEN FLD QL: NEGATIVE — SIGNIFICANT CHANGE UP
WBC # BLD: 6.61 K/UL — SIGNIFICANT CHANGE UP (ref 3.8–10.5)
WBC # FLD AUTO: 6.61 K/UL — SIGNIFICANT CHANGE UP (ref 3.8–10.5)
WBC UR QL: 2 /HPF — SIGNIFICANT CHANGE UP (ref 0–5)

## 2022-12-28 PROCEDURE — 93005 ELECTROCARDIOGRAM TRACING: CPT

## 2022-12-28 PROCEDURE — 99285 EMERGENCY DEPT VISIT HI MDM: CPT

## 2022-12-28 PROCEDURE — 71045 X-RAY EXAM CHEST 1 VIEW: CPT | Mod: 26

## 2022-12-28 PROCEDURE — 70450 CT HEAD/BRAIN W/O DYE: CPT | Mod: MA

## 2022-12-28 PROCEDURE — 81001 URINALYSIS AUTO W/SCOPE: CPT

## 2022-12-28 PROCEDURE — 80307 DRUG TEST PRSMV CHEM ANLYZR: CPT

## 2022-12-28 PROCEDURE — 70450 CT HEAD/BRAIN W/O DYE: CPT | Mod: 26,MA

## 2022-12-28 PROCEDURE — 85025 COMPLETE CBC W/AUTO DIFF WBC: CPT

## 2022-12-28 PROCEDURE — 87637 SARSCOV2&INF A&B&RSV AMP PRB: CPT

## 2022-12-28 PROCEDURE — 71045 X-RAY EXAM CHEST 1 VIEW: CPT

## 2022-12-28 PROCEDURE — 83735 ASSAY OF MAGNESIUM: CPT

## 2022-12-28 PROCEDURE — 80053 COMPREHEN METABOLIC PANEL: CPT

## 2022-12-28 PROCEDURE — 99285 EMERGENCY DEPT VISIT HI MDM: CPT | Mod: 25

## 2022-12-28 PROCEDURE — 93010 ELECTROCARDIOGRAM REPORT: CPT

## 2022-12-28 PROCEDURE — 82140 ASSAY OF AMMONIA: CPT

## 2022-12-28 RX ORDER — FOLIC ACID 0.8 MG
1 TABLET ORAL DAILY
Refills: 0 | Status: DISCONTINUED | OUTPATIENT
Start: 2022-12-28 | End: 2022-12-28

## 2022-12-28 RX ORDER — DONEPEZIL HYDROCHLORIDE 10 MG/1
5 TABLET, FILM COATED ORAL ONCE
Refills: 0 | Status: COMPLETED | OUTPATIENT
Start: 2022-12-28 | End: 2022-12-28

## 2022-12-28 RX ORDER — DIPHENHYDRAMINE HCL 50 MG
25 CAPSULE ORAL ONCE
Refills: 0 | Status: DISCONTINUED | OUTPATIENT
Start: 2022-12-28 | End: 2022-12-28

## 2022-12-28 RX ORDER — DONEPEZIL HYDROCHLORIDE 10 MG/1
5 TABLET, FILM COATED ORAL AT BEDTIME
Refills: 0 | Status: DISCONTINUED | OUTPATIENT
Start: 2022-12-28 | End: 2022-12-28

## 2022-12-28 RX ORDER — FLUOXETINE HCL 10 MG
10 CAPSULE ORAL DAILY
Refills: 0 | Status: DISCONTINUED | OUTPATIENT
Start: 2022-12-28 | End: 2022-12-28

## 2022-12-28 RX ORDER — CHOLECALCIFEROL (VITAMIN D3) 125 MCG
1000 CAPSULE ORAL DAILY
Refills: 0 | Status: DISCONTINUED | OUTPATIENT
Start: 2022-12-28 | End: 2022-12-28

## 2022-12-28 RX ORDER — MEMANTINE HYDROCHLORIDE 10 MG/1
5 TABLET ORAL
Refills: 0 | Status: DISCONTINUED | OUTPATIENT
Start: 2022-12-28 | End: 2022-12-28

## 2022-12-28 RX ORDER — MEMANTINE HYDROCHLORIDE 10 MG/1
10 TABLET ORAL ONCE
Refills: 0 | Status: COMPLETED | OUTPATIENT
Start: 2022-12-28 | End: 2022-12-28

## 2022-12-28 RX ORDER — LANOLIN ALCOHOL/MO/W.PET/CERES
3 CREAM (GRAM) TOPICAL AT BEDTIME
Refills: 0 | Status: DISCONTINUED | OUTPATIENT
Start: 2022-12-28 | End: 2022-12-28

## 2022-12-28 RX ORDER — ONDANSETRON 8 MG/1
4 TABLET, FILM COATED ORAL EVERY 8 HOURS
Refills: 0 | Status: DISCONTINUED | OUTPATIENT
Start: 2022-12-28 | End: 2022-12-28

## 2022-12-28 RX ORDER — ACETAMINOPHEN 500 MG
650 TABLET ORAL EVERY 6 HOURS
Refills: 0 | Status: DISCONTINUED | OUTPATIENT
Start: 2022-12-28 | End: 2022-12-28

## 2022-12-28 RX ADMIN — DONEPEZIL HYDROCHLORIDE 5 MILLIGRAM(S): 10 TABLET, FILM COATED ORAL at 10:00

## 2022-12-28 RX ADMIN — Medication 10 MILLIGRAM(S): at 10:00

## 2022-12-28 RX ADMIN — MEMANTINE HYDROCHLORIDE 10 MILLIGRAM(S): 10 TABLET ORAL at 10:00

## 2022-12-28 NOTE — ED PROVIDER NOTE - CLINICAL SUMMARY MEDICAL DECISION MAKING FREE TEXT BOX
Had a discussion with patient's current caretaker, who is scheduled to be taking care of the patient today, states that she is able to come in and take the patient home, patient vital signs stable, has no current complaints, will provide breakfast and monitor as we await patient's caretaker to arrive.  We will get in touch with social to assess patient's safety and being able to return home.

## 2022-12-28 NOTE — H&P ADULT - NSHPOUTPATIENTPROVIDERS_GEN_ALL_CORE
[facility]. [facility] ([facility] neurology DrMayur Fleming [phone]

## 2022-12-28 NOTE — ED PROVIDER NOTE - CADM POA PRESS ULCER
Patient NA for AT at this time secondary to need for reduced stimuli.  Will monitor patient needs.   No

## 2022-12-28 NOTE — ED PROVIDER NOTE - CHIEF COMPLAINT
07/09/2019  Tyrone Leon is a 10 y.o., male with a history of tetralogy of Fallot. Based on review of documentation of the pediatric cardiologist in [place] (Dr. Alfred Richardson) Tyrone underwent trans-annular patch repair of tetralogy of Fallot in December 2009 at [place].  He has since been followed in [place], [place], and was last evaluated by Dr. Richardson on 10/14/2018, at which time he was doing well.  An echocardiogram revealed good biventricular function, mild RV enlargement and a PFO.  He is known to have a right aortic arch. They have since moved to the area and he has been followed in cardiology by Dr. Tafoya. At the time of the first visit, the father mentioned that pulmonary valve replacement was anticipated at approximately 10 years of age. They had reported that Tyrone lately had been tiring out more with activity. He was scheduled for cardiac MRI to evaluate RV size and function. Findings showed:     Cardiac MRI 4/11/19:  1. Increased right ventricular volumes. (RVEDV 169 cc/m2 for BSA, RVESV 81 cc/m2 for BSA).  RVEF 52 %. The RV volumes are 2 times that of the LV.   2. Normal left ventricular volume with LVEF 54 %.  3. Free pulmonary insufficiency with regurgitation fraction of 54%.   4. The MPA is low normal in size. The RPA is low normal in size, and the LPA is normal in size.  5. Mild enlargement of the aortic root.   6. Right arch, mirror image branching.      His data was discussed at the CV surgery and cardiology conference on May 3. The team agreed to recommend pulmonary valve replacement,    Anesthesia Evaluation    I have reviewed the Patient Summary Reports.    I have reviewed the Nursing Notes.   I have reviewed the Medications.     Review of Systems  Anesthesia Hx:  No problems with previous Anesthesia Denies Hx of Anesthetic complications  History of prior surgery of  interest to airway management or planning: heart surgery. Previous anesthesia: General Airway issues documented on chart review include mask, easy, GETA, easy direct laryngoscopy  Denies Family Hx of Anesthesia complications.    Social:  Non-Smoker, No Alcohol Use    Hematology/Oncology:  Hematology Normal   Oncology Normal     EENT/Dental:EENT/Dental Normal   Cardiovascular:   Exercise tolerance: good Valvular problems/Murmurs ECG has been reviewed. Palliated TOF, doing well, starting to have some exc intolerance  Good BiV func, right arch Functional Capacity low / < 4 METS   Congenital Heart Disease    Congenital Heart Disease:  Tetralogy of Fallot (TOF), s/p surgical repair    Pulmonary:  Pulmonary Normal    Renal/:  Renal/ Normal     Hepatic/GI:  Hepatic/GI Normal    Musculoskeletal:  Musculoskeletal Normal    OB/GYN/PEDS:  No fever/uri/lri  Normal behavior  NPO   Neurological:  Neurology Normal    Endocrine:  Endocrine Normal    Dermatological:  Skin Normal    Psych:   Denies Psychiatric History.          Physical Exam  General:  Well nourished    Airway/Jaw/Neck:  Airway Findings: Mouth Opening: Normal Tongue: Normal  General Airway Assessment: Good, Pediatric  Mallampati: II  TM Distance: 4 - 6 cm     Eyes/Ears/Nose:  EYES/EARS/NOSE FINDINGS: Normal   Dental:  Dental Findings: In tact   Chest/Lungs:  Chest/Lungs Findings: Clear to auscultation, Normal Respiratory Rate     Heart/Vascular:  Heart Findings: Rate: Normal  Rhythm: Regular Rhythm  Sounds: Normal  Heart murmur: negative       Mental Status:  Mental Status Findings:  Cooperative, Normally Active child, Alert and Oriented         Anesthesia Plan  Type of Anesthesia, risks & benefits discussed:  Anesthesia Type:  general  Patient's Preference:   Intra-op Monitoring Plan: standard ASA monitors, arterial line and central line  Intra-op Monitoring Plan Comments:   Post Op Pain Control Plan: multimodal analgesia, peripheral nerve block and IV/PO  Opioids PRN  Post Op Pain Control Plan Comments:   Induction:   Inhalation  Beta Blocker:  Patient is not currently on a Beta-Blocker (No further documentation required).       Informed Consent: Patient representative understands risks and agrees with Anesthesia plan.  Questions answered. Anesthesia consent signed with patient representative.  ASA Score: 3     Day of Surgery Review of History & Physical:    H&P update referred to the surgeon.         Ready For Surgery From Anesthesia Perspective.        The patient is a 72y Female complaining of medical evaluation.

## 2022-12-28 NOTE — DISCHARGE NOTE PROVIDER - NSDCHC_MEDRECSTATUS_GEN_ALL_CORE
Attending Attestation (For Attendings USE Only)...
Admission Reconciliation is Completed  Discharge Reconciliation is Completed

## 2022-12-28 NOTE — ED PROVIDER NOTE - OBJECTIVE STATEMENT
72-year-old female with past medical history of Alzheimer's as well as anxiety presenting to the emergency room after being found wandering outside this morning.  Patient has a history of being loss from her residence, was admitted last year for similar circumstances, patient stating that she went outside to help someone, is unable to further elucidate why she left her domicile, was able to talk with 1 of patient's caretakers, friends you know, who states that the other caretaker should have been around as patient receives 24/7 care, Cristiano was on her way to replace her and is not sure why the patient stepped out of the house, patient is stating that she would like to go and denies any complaints including pain dizziness chest pain shortness of breath nausea vomiting diarrhea.  Patient appears to be alert to person and place. Patient stating that her  is away on a business trip in [place].

## 2022-12-28 NOTE — H&P ADULT - ASSESSMENT
72-year-old female with past medical history of Alzheimer's as well as anxiety presenting to the emergency room after being found wandering outside this morning. Patient has a history of being loss from her residence, was admitted last year for similar circumstances, patient stating that she went outside to help someone, is unable to further elucidate why she left her domicile, was able to talk with 1 of patient's caretakers, friends you know, who states that the other caretaker should have been around as patient receives 24/7 care, Cristiano was on her way to replace her and is not sure why the patient stepped out of the house, patient is stating that she would like to go and denies any complaints including pain dizziness chest pain shortness of breath nausea vomiting diarrhea.  Patient appears to be alert to person and place. Patient stating that her  is away on a business trip in [place].     Problem/Plan - 1:  ·  Problem: Cognitive disorder/ Dementia  ·  Plan: no current evidence of depression, jd or psychosis. She is not suicidal, homicidal or violent. There is no acute psychiatric symptoms that would require inpatient psychiatric admission.  - Pt being managed outpatient at [facility] by Dr. Dr. Carlos Felming [phone]  - c/w memantine/aricept  - previous TSH wnl.   - UA is negative. no signs of infection.   - CT head neg.  - Medically stable for dc planning  - CM/SW helping coordinate with  re safe d/c planning.  - Absolutely NO DRIVING. d/w pt and the  Dr. Leroy Monterroso [phone] (retired Internist).  - If pt has Adequate home aide and supervision at home, then dc planning.   - d/w social work.      Problem/Plan - 2:  ·  Problem: Anxiety.   ·  Plan: Buspar 10mg BID, Donepezil 5mg BID, Memantine 5mg BID, Prozac 10mg daily.  medication reviewed from PCP Dr. Tillman office and recent hospital visit.   (the  doesn't have full list). home aide Cristiano  not picking up her call yet.      Problem/Plan - 3:  ·  Problem: Dementia.   ·  Plan: Continue Aricept and Namenda.     Problem/Plan - 4:  ·  Problem: Preventive measure.   ·  Plan: VTE: early ambulation.   Fall , Aspiration, safety and seizure precautions.     72-year-old female with past medical history of Alzheimer's as well as anxiety presenting to the emergency room after being found wandering outside this morning. Patient has a history of being loss from her residence, was admitted last year for similar circumstances, patient stating that she went outside to help someone, is unable to further elucidate why she left her domicile, was able to talk with 1 of patient's caretakers, friends you know, who states that the other caretaker should have been around as patient receives 24/7 care, Cristiano was on her way to replace her and is not sure why the patient stepped out of the house, patient is stating that she would like to go and denies any complaints including pain dizziness chest pain shortness of breath nausea vomiting diarrhea.  Patient appears to be alert to person and place. Patient stating that her  is away on a business trip in [place].     Problem/Plan - 1:  ·  Problem: Cognitive disorder/ Dementia  ·  Plan: no current evidence of depression, jd or psychosis. She is not suicidal, homicidal or violent. There is no acute psychiatric symptoms that would require inpatient psychiatric admission.  - Pt being managed outpatient at [facility] by  Dr. Carlos Fleming [phone]  - c/w memantine/aricept  - previous TSH wnl.   - UA is negative. no signs of infection.   - CT head neg.  - Medically stable for dc planning  - CM/SW helping coordinate with  re safe d/c planning.  - Absolutely NO DRIVING. d/w pt and the  Dr. Leroy Monterroso [phone] (retired Internist).  - If pt has Adequate home aide and supervision at home, then dc planning.   - d/w social work.      Problem/Plan - 2:  ·  Problem: Anxiety.   ·  Plan: Buspar 10mg BID, Donepezil 5mg BID, Memantine 5mg BID, Prozac 10mg daily.  medication reviewed from PCP Dr. Tillman office and recent hospital visit.   (the  doesn't have full list). home aide Cristiano  not picking up her call yet.      Problem/Plan - 3:  ·  Problem: Dementia.   ·  Plan: Continue Aricept and Namenda.     Problem/Plan - 4:  ·  Problem: Preventive measure.   ·  Plan: VTE: early ambulation.   Fall , Aspiration, safety and seizure precautions.

## 2022-12-28 NOTE — DISCHARGE NOTE PROVIDER - NSDCFUADDAPPT_GEN_ALL_CORE_FT
APPTS ARE READY TO BE MADE: [x ] YES    Best Family or Patient Contact (if needed):    Additional Information about above appointments (if needed):    1: pcp  2:   3:     Other comments or requests:    APPTS ARE READY TO BE MADE: [x ] YES    Best Family or Patient Contact (if needed):    Additional Information about above appointments (if needed):    1: pcp  2:   3:     Other comments or requests: Patient was outreached, but was unwilling to verify their date of birth or allow us to relay referral information.

## 2022-12-28 NOTE — ED PROVIDER NOTE - NSICDXPASTSURGICALHX_GEN_ALL_CORE_FT
PAST SURGICAL HISTORY:  H/O: hysterectomy     No significant past surgical history     S/P hysterectomy

## 2022-12-28 NOTE — H&P ADULT - NSHPPHYSICALEXAM_GEN_ALL_CORE
Vital Signs Last 24 Hrs  T(C): 37.1 (28 Dec 2022 09:32), Max: 37.1 (28 Dec 2022 06:42)  T(F): 98.8 (28 Dec 2022 09:32), Max: 98.8 (28 Dec 2022 09:32)  HR: 64 (28 Dec 2022 09:32) (64 - 68)  BP: 131/64 (28 Dec 2022 09:32) (131/64 - 150/84)  BP(mean): --  RR: 18 (28 Dec 2022 09:32) (18 - 20)  SpO2: 99% (28 Dec 2022 09:32) (98% - 99%)    Parameters below as of 28 Dec 2022 09:32  Patient On (Oxygen Delivery Method): room air      PHYSICAL EXAM:  GENERAL: NAD, well-developed, comfortable  HEAD:  Atraumatic, Normocephalic  EYES: EOMI, PERRLA, conjunctiva and sclera clear  NECK: Supple, No JVD  CHEST/LUNG: Clear to auscultation bilaterally; No wheeze  HEART: Regular rate and rhythm; No murmurs, rubs, or gallops  ABDOMEN: Soft, Nontender, Nondistended; Bowel sounds present  Neuro: AAOx2, forgetful, poor historian, no focal weakness, 5/5 b/l extremity strength  EXTREMITIES:  2+ Peripheral Pulses, No clubbing, cyanosis, or edema  SKIN: No rashes or lesions

## 2022-12-28 NOTE — DISCHARGE NOTE PROVIDER - NSDCMRMEDTOKEN_GEN_ALL_CORE_FT
BuSpar 10 mg oral tablet: 1 tab(s) orally 2 times a day  cholecalciferol oral tablet: 1000 unit(s) orally once a day  donepezil 5 mg oral tablet: 1 tab(s) orally 2 times a day  folic acid 1 mg oral tablet: 1 tab(s) orally once a day  melatonin 5 mg oral tablet: 1 tab(s) orally once a day (at bedtime)  memantine 5 mg oral tablet: 1 tab(s) orally 2 times a day  Multiple Vitamins oral tablet: 1 tab(s) orally once a day  PROzac 10 mg oral tablet: 1 tab(s) orally once a day

## 2022-12-28 NOTE — H&P ADULT - NSHPLABSRESULTS_GEN_ALL_CORE
LABS:                        11.9   6.61  )-----------( 294      ( 28 Dec 2022 10:01 )             37.5         139  |  104  |  15  ----------------------------<  105<H>  4.5   |  25  |  0.66    Ca    9.4      28 Dec 2022 10:01  Mg     2.1         TPro  6.8  /  Alb  4.3  /  TBili  1.4<H>  /  DBili  x   /  AST  28  /  ALT  21  /  AlkPhos  76        CAPILLARY BLOOD GLUCOSE            Urinalysis Basic - ( 28 Dec 2022 11:37 )    Color: Light Yellow / Appearance: Slightly Turbid / S.016 / pH: x  Gluc: x / Ketone: Negative  / Bili: Negative / Urobili: Negative   Blood: x / Protein: Trace / Nitrite: Negative   Leuk Esterase: Small / RBC: 3 /hpf / WBC 2 /HPF   Sq Epi: x / Non Sq Epi: 2 /hpf / Bacteria: Negative        RADIOLOGY & ADDITIONAL TESTS:    Imaging Personally Reviewed:  [x] YES  [ ] NO    Consultant(s) Notes Reviewed:  [x] YES  [ ] NO    Care Discussed with Consultants/Other Providers [x] YES  [ ] NO

## 2022-12-28 NOTE — DISCHARGE NOTE PROVIDER - PROVIDER TOKENS
FREE:[LAST:[PCP],PHONE:[(   )    -],FAX:[(   )    -],FOLLOWUP:[1 week],ESTABLISHEDPATIENT:[T]],FREE:[LAST:[Dr. Carlos Fleming [phone]],PHONE:[(   )    -],FAX:[(   )    -],FOLLOWUP:[1 week],ESTABLISHEDPATIENT:[T]]

## 2022-12-28 NOTE — DISCHARGE NOTE PROVIDER - CARE PROVIDER_API CALL
PCP,   Phone: (   )    -  Fax: (   )    -  Established Patient  Follow Up Time: 1 week    Dr. Carlos Fleming [phone],   Phone: (   )    -  Fax: (   )    -  Established Patient  Follow Up Time: 1 week

## 2022-12-28 NOTE — H&P ADULT - NSHPADDITIONALINFOADULT_GEN_ALL_CORE
d/w pt and NP.  d/w the .  d/w LISBET.    - Dr. CORNELIUS Gomez ([facility])  - [phone] d/w pt and NP.  d/w the .  d/w SW.    addendum: d/w pt's neuro Dr. Carlos Fleming [phone] at [facility]. Prefers 24/7 aide prior to dc planning since she has becoming more forgetful.   d/w SW.  will need confirmation from the  prior to dc planning.     - Dr. CORNELIUS Gomez ([facility])  - [phone]

## 2022-12-28 NOTE — ED ADULT TRIAGE NOTE - CHIEF COMPLAINT QUOTE
medical evaluation after neighbors found patient wandering around outside. Pt has hx of alzheimer's, per EMS has aide that comes 4 times a day

## 2022-12-28 NOTE — ED ADULT NURSE REASSESSMENT NOTE - NS ED NURSE REASSESS COMMENT FT1
Report received on patient by ARSENIO Cesar, patient sitting in stretcher ,1:1 at bedside, patient calm and cooperative, no complaints offered.

## 2022-12-28 NOTE — DISCHARGE NOTE PROVIDER - NSDCCPCAREPLAN_GEN_ALL_CORE_FT
PRINCIPAL DISCHARGE DIAGNOSIS  Diagnosis: Alzheimer disease  Assessment and Plan of Treatment: Fall precautions, continue all home meds and follow up with pcp within 1 week  return if worsens      SECONDARY DISCHARGE DIAGNOSES  Diagnosis: Confusion  Assessment and Plan of Treatment: baseline
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
## 2022-12-28 NOTE — DISCHARGE NOTE PROVIDER - HOSPITAL COURSE
72-year-old female with past medical history of Alzheimer's as well as anxiety presenting to the emergency room after being found wandering outside this morning.      Problem/Plan - 1:  ·  Problem: Cognitive disorder/ Dementia  ·  Plan: no current evidence of depression, jd or psychosis. She is not suicidal, homicidal or violent. There is no acute psychiatric symptoms that would require inpatient psychiatric admission.  - Pt being managed outpatient at [facility] by  Dr. Carlos Fleming [phone]  - c/w memantine/aricept  - previous TSH wnl.   - UA is negative. no signs of infection.   - CT head neg.  - Medically stable for dc planning  - CM/SW helping coordinate with  re safe d/c planning.  - Absolutely NO DRIVING. d/w pt and the  Dr. Leroy Monterroso [phone] (retired Internist).  - Patient has Adequate home aide and supervision at home,    - d/w social work.   -Discharge home by Dr Gomez   72-year-old female with past medical history of Alzheimer's as well as anxiety presenting to the emergency room after being found wandering outside this morning.      Problem/Plan - 1:  ·  Problem: Cognitive disorder/ Dementia  ·  Plan: no current evidence of depression, jd or psychosis. She is not suicidal, homicidal or violent. There is no acute psychiatric symptoms that would require inpatient psychiatric admission.  - Pt being managed outpatient at [facility] by  Dr. Carlos Fleming [phone]  - c/w memantine/aricept  - previous TSH wnl.   - UA is negative. no signs of infection.   - CT head neg.  - Medically stable for dc planning  - CM/SW helping coordinate with  re safe d/c planning.  - Absolutely NO DRIVING. d/w pt and the  Dr. Leroy Monterroso [phone] (retired Internist).  - Patient has Adequate home aide and supervision at home,    - d/w social work.   -Discharge home by Dr Gomez    Attending Addendum:  Patient seen and examined by me on the discharge day. Medications reviewed.  d/w the  numerous times today. d/w SW and NP.  D/w neurology who advised 24/7 aide given her cognitive decline/ dementia over the course of years.  The  agree to extend current home aide hours to 24/7. He will be back from [facility].  All questions answered in details. Follow up plan explained.  More than 92 mins were spent doing H&P, evaluating patient and coordinating care for discharge.  Discharge summary sent to pt's primary care physician at [facility].   d/w neuro and left message with [facility] PCP Dr. Johnston.

## 2022-12-28 NOTE — ED ADULT NURSE NOTE - CHIEF COMPLAINT
Message   Recorded as Task   Date: 09/07/2017 01:43 PM, Created By: Ana Hargrove   Task Name: Care Coordination   Assigned To: Jarad Luu   Regarding Patient: Chad Adamson, Status: Active   Comment:    Ana Hargrove - 07 Sep 2017 1:43 PM     TASK CREATED  Mother called into the office today and states that he daughter is very hesitant to switch to basal-bolus  Mother states that she would like to keep the current regemen of the humalin and humalog and maybe try basal-bolus in spring   Mother also states that child is hesitant to come in and meet with education   Mother states that it is hard to get pt to be seen   Mother would like your thought on this   Message Free Text Note Form:   If Iveth Roberson wants to stay on her current regimen, and her blood sugars are good and she is healthy, that is of course fine  Discussed this with mother by telephone        Signatures   Electronically signed by : ANA Busby ; Sep  8 2017  3:37PM EST                       (Author) The patient is a 72y Female complaining of medical evaluation.

## 2022-12-28 NOTE — ED ADULT NURSE NOTE - OBJECTIVE STATEMENT
0722 Pt in ER gold room 3. Awake confused, fully dressed, well groomed. Per night nurse and triage note pt has a pmh alzheimers and was found wandering in street. Pt states "I am perfectly healthy. I don't know why they put me here. I want to leave." No obvious injuries. Dr Loyd speaking with pt. constant observation initiated.

## 2022-12-28 NOTE — H&P ADULT - NSHPREVIEWOFSYSTEMS_GEN_ALL_CORE
REVIEW OF SYSTEMS:  General: no weakness, no fever/chills, no weight loss/gain  Skin/Breast: no rash, no jaundice  Ophthalmologic: no vision changes, no dry eyes   Respiratory and Thorax: no cough, no wheezing, no hemoptysis, no dyspnea  Cardiovascular: no chest pain, no shortness of breath, no orthopnea  Gastrointestinal: no n/v/d, no abdominal pain, no dysphagia   Genitourinary: no dysuria, no frequency, no nocturia, no hematuria  Musculoskeletal: no trauma, no sprain/strain, no myalgias, no arthralgias, no fracture  Neurological: no HA, no dizziness, no weakness, no numbness  Psychiatric: no depression, no SI/HI  Hematology/Lymphatics: no easy bruising  Endocrine: no heat or cold intolerance. no weight gain or loss  Allergic/Immunologic: no allergy or recent reaction

## 2022-12-28 NOTE — ED PROVIDER NOTE - NSICDXPASTMEDICALHX_GEN_ALL_CORE_FT
PAST MEDICAL HISTORY:  Alopecia     Anemia     Anxiety     Dementia     Early onset Alzheimer's dementia     Uterine fibroid

## 2022-12-28 NOTE — ED PROVIDER NOTE - PHYSICAL EXAMINATION
Physical Exam:   GEN: in no acute distress, AAOx2  HENT: NCAT, MMM, no stridor  EYES: PERRLA, EOMI  RESP: CTAB, no respiratory distress  CV: normal rate and regular rhythm, S1 S2  ABD: soft and NTND  EXT: No edema, No bony deformity of extremities  SKIN: No skin breaks, skin color normal for race  NEURO: CN grossly intact, No focal motor or sensory deficits.

## 2023-10-07 NOTE — ED PROVIDER NOTE - ATTENDING CONTRIBUTION TO CARE
72 F w/ self ambulating, history of dementia, brought in by EMS for wandering, pt arrives in the er requesting dc due to dog at home and no one to care for the dog, she has a  michael who she reports is in the [place] with his girlfriend. pt states she has many friends and family however is unable to provide phone numbers, pt w/ a cellphone but doesn't know the code to open it. She reports that she has no cp,  no sob, no nausea no vomiting, Pt is ambulatory in the department oriented to self and place (hospital). per chart review pt w/ similar hx of wandering and was admitted w/ BH referral.   On exam, pt is awake and alert oriented x2, she is well dressed, she has clear lungs soft abdomen , normocephalic atruamatic, she has 2+ radial pulse, plan to obtain collateral consult social work, may require admission as she likely is not a safe discharge. 4 = No assist / stand by assistance

## 2023-10-08 NOTE — ED ADULT NURSE NOTE - NS TRANSFER PATIENT BELONGINGS
None You can access the FollowMyHealth Patient Portal offered by [facility] by registering at the following website: [email or web address]. By joining [employer]s FollowMyHealth portal, you will also be able to view your health information using other applications (apps) compatible with our system.

## 2024-01-24 ENCOUNTER — EMERGENCY (EMERGENCY)
Facility: HOSPITAL | Age: 74
LOS: 1 days | Discharge: HOME | End: 2024-01-24
Attending: EMERGENCY MEDICINE | Admitting: EMERGENCY MEDICINE
Payer: MEDICARE

## 2024-01-24 VITALS
TEMPERATURE: 98 F | OXYGEN SATURATION: 100 % | RESPIRATION RATE: 18 BRPM | HEART RATE: 71 BPM | SYSTOLIC BLOOD PRESSURE: 138 MMHG | DIASTOLIC BLOOD PRESSURE: 81 MMHG

## 2024-01-24 DIAGNOSIS — Z90.710 ACQUIRED ABSENCE OF BOTH CERVIX AND UTERUS: Chronic | ICD-10-CM

## 2024-01-24 PROCEDURE — 99284 EMERGENCY DEPT VISIT MOD MDM: CPT | Mod: FS

## 2024-01-24 NOTE — ED ADULT NURSE NOTE - NSFALLUNIVINTERV_ED_ALL_ED
Bed/Stretcher in lowest position, wheels locked, appropriate side rails in place/Call bell, personal items and telephone in reach/Instruct patient to call for assistance before getting out of bed/chair/stretcher/Non-slip footwear applied when patient is off stretcher/[place] to call system/Physically safe environment - no spills, clutter or unnecessary equipment/Purposeful proactive rounding/Room/bathroom lighting operational, light cord in reach
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
## 2024-01-24 NOTE — ED ADULT NURSE NOTE - CHIEF COMPLAINT QUOTE
pt was found wandering, endorses no medical complaints but A&0x2.  hx dementia, anemia   [phone]

## 2024-01-24 NOTE — ED ADULT TRIAGE NOTE - CHIEF COMPLAINT QUOTE
pt was found wandering, endorses no medical complaints but A&0x2.  hx dementia, anemia pt was found wandering, endorses no medical complaints but A&0x2.  hx dementia, anemia   [phone]

## 2024-01-25 VITALS
HEART RATE: 73 BPM | SYSTOLIC BLOOD PRESSURE: 130 MMHG | DIASTOLIC BLOOD PRESSURE: 79 MMHG | TEMPERATURE: 98 F | RESPIRATION RATE: 20 BRPM | OXYGEN SATURATION: 100 %

## 2024-01-25 NOTE — ED PROVIDER NOTE - PATIENT PORTAL LINK FT
You can access the FollowMyHealth Patient Portal offered by [facility] by registering at the following website: [email or web address]. By joining [employer]’s FollowMyHealth portal, you will also be able to view your health information using other applications (apps) compatible with our system.
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
## 2024-01-25 NOTE — ED PROVIDER NOTE - ATTENDING APP SHARED VISIT CONTRIBUTION OF CARE
73-year-old female, past medical history Alzheimer's/dementia??  Presenting for evaluation after being found wandering. On presentation patient is AAOx2, which is baseline.  Patient's  was contacted, who provided history.  He states that after dinner patient walked out of the house, and was missing roughly for 1 hour. She was found by neighbors and called 911 was activated.  He states that patient has a history of wandering, and is currently being worked up by a neurologist extensively for her mental decline. He also reports that patient recently had routine blood work, MRI performed approximately 2 weeks ago, labs with no concerning results. patient denies recent injuries or trauma, SI/HI

## 2024-01-25 NOTE — ED PROVIDER NOTE - CLINICAL SUMMARY MEDICAL DECISION MAKING FREE TEXT BOX
patient is a 73-year-old female, past medical history Alzheimer's/dementia??  Presenting for evaluation after being found wandering. On presentation patient is AAOx2 which is baseline. She is calm and cooperative. Vital stable, patient does not have any physical signs of injury or trauma. History provided by  who states that patient is being worked up extensively for her Alzheimer's/dementia, does have history of wandering.  Patient had routine blood work and MRI done outpatient approximately few weeks ago with no abnormal findings. patient is a 73-year-old female, past medical history Alzheimer's/dementia??  Presenting for evaluation after being found wandering. On presentation patient is AAOx2, which is her baseline. She is calm and cooperative. Vital stable, patient does not have any physical signs of injury or trauma. History provided by  who states that patient is being worked up extensively for her mental decline,   Patient had routine blood work and MRI done outpatient approximately 2 weeks ago with no abnormal findings. patient  states that he will come and pick patient up.

## 2024-01-25 NOTE — PROGRESS NOTE BEHAVIORAL HEALTH - NS ED BHA MED ROS EYES
Patient is transferred back to the floor from GI post EGD. After transferring patient noted to be dyspneic with saturation of 82. Patient placed on 2L oxy mask with positive improvement in symptoms.  Will continue to monitor post procedure vitals    No complaints
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
## 2024-09-15 ENCOUNTER — INPATIENT (INPATIENT)
Facility: HOSPITAL | Age: 74
LOS: 16 days | Discharge: HOME HEALTH | End: 2024-10-02
Attending: PSYCHIATRY & NEUROLOGY | Admitting: PSYCHIATRY & NEUROLOGY
Payer: MEDICARE

## 2024-09-15 VITALS
SYSTOLIC BLOOD PRESSURE: 115 MMHG | TEMPERATURE: 98 F | DIASTOLIC BLOOD PRESSURE: 69 MMHG | OXYGEN SATURATION: 100 % | HEART RATE: 70 BPM | RESPIRATION RATE: 17 BRPM

## 2024-09-15 DIAGNOSIS — F32.1 MAJOR DEPRESSIVE DISORDER, SINGLE EPISODE, MODERATE: ICD-10-CM

## 2024-09-15 DIAGNOSIS — Z90.710 ACQUIRED ABSENCE OF BOTH CERVIX AND UTERUS: Chronic | ICD-10-CM

## 2024-09-15 LAB
ALBUMIN SERPL ELPH-MCNC: 4.4 G/DL — SIGNIFICANT CHANGE UP (ref 3.3–5)
ALP SERPL-CCNC: 107 U/L — SIGNIFICANT CHANGE UP (ref 40–120)
ALT FLD-CCNC: 14 U/L — SIGNIFICANT CHANGE UP (ref 4–33)
AMPHET UR-MCNC: NEGATIVE — SIGNIFICANT CHANGE UP
ANION GAP SERPL CALC-SCNC: 11 MMOL/L — SIGNIFICANT CHANGE UP (ref 7–14)
APAP SERPL-MCNC: <10 UG/ML — LOW (ref 15–25)
APPEARANCE UR: CLEAR — SIGNIFICANT CHANGE UP
AST SERPL-CCNC: 20 U/L — SIGNIFICANT CHANGE UP (ref 4–32)
BACTERIA # UR AUTO: NEGATIVE /HPF — SIGNIFICANT CHANGE UP
BARBITURATES UR SCN-MCNC: NEGATIVE — SIGNIFICANT CHANGE UP
BASOPHILS # BLD AUTO: 0.04 K/UL — SIGNIFICANT CHANGE UP (ref 0–0.2)
BASOPHILS NFR BLD AUTO: 0.5 % — SIGNIFICANT CHANGE UP (ref 0–2)
BENZODIAZ UR-MCNC: NEGATIVE — SIGNIFICANT CHANGE UP
BILIRUB SERPL-MCNC: 0.4 MG/DL — SIGNIFICANT CHANGE UP (ref 0.2–1.2)
BILIRUB UR-MCNC: NEGATIVE — SIGNIFICANT CHANGE UP
BUN SERPL-MCNC: 21 MG/DL — SIGNIFICANT CHANGE UP (ref 7–23)
CALCIUM SERPL-MCNC: 9.8 MG/DL — SIGNIFICANT CHANGE UP (ref 8.4–10.5)
CAST: 2 /LPF — SIGNIFICANT CHANGE UP (ref 0–4)
CHLORIDE SERPL-SCNC: 104 MMOL/L — SIGNIFICANT CHANGE UP (ref 98–107)
CO2 SERPL-SCNC: 25 MMOL/L — SIGNIFICANT CHANGE UP (ref 22–31)
COCAINE METAB.OTHER UR-MCNC: NEGATIVE — SIGNIFICANT CHANGE UP
COLOR SPEC: YELLOW — SIGNIFICANT CHANGE UP
CREAT SERPL-MCNC: 0.72 MG/DL — SIGNIFICANT CHANGE UP (ref 0.5–1.3)
CREATININE URINE RESULT, DAU: 185 MG/DL — SIGNIFICANT CHANGE UP
DIFF PNL FLD: ABNORMAL
EGFR: 88 ML/MIN/1.73M2 — SIGNIFICANT CHANGE UP
EOSINOPHIL # BLD AUTO: 0.21 K/UL — SIGNIFICANT CHANGE UP (ref 0–0.5)
EOSINOPHIL NFR BLD AUTO: 2.4 % — SIGNIFICANT CHANGE UP (ref 0–6)
ETHANOL SERPL-MCNC: <10 MG/DL — SIGNIFICANT CHANGE UP
FENTANYL UR QL SCN: NEGATIVE — SIGNIFICANT CHANGE UP
GLUCOSE SERPL-MCNC: 91 MG/DL — SIGNIFICANT CHANGE UP (ref 70–99)
GLUCOSE UR QL: NEGATIVE MG/DL — SIGNIFICANT CHANGE UP
HCT VFR BLD CALC: 39.7 % — SIGNIFICANT CHANGE UP (ref 34.5–45)
HGB BLD-MCNC: 13.1 G/DL — SIGNIFICANT CHANGE UP (ref 11.5–15.5)
IANC: 5.37 K/UL — SIGNIFICANT CHANGE UP (ref 1.8–7.4)
IMM GRANULOCYTES NFR BLD AUTO: 0.3 % — SIGNIFICANT CHANGE UP (ref 0–0.9)
KETONES UR-MCNC: ABNORMAL MG/DL
LEUKOCYTE ESTERASE UR-ACNC: NEGATIVE — SIGNIFICANT CHANGE UP
LYMPHOCYTES # BLD AUTO: 2.12 K/UL — SIGNIFICANT CHANGE UP (ref 1–3.3)
LYMPHOCYTES # BLD AUTO: 24.5 % — SIGNIFICANT CHANGE UP (ref 13–44)
MCHC RBC-ENTMCNC: 29.4 PG — SIGNIFICANT CHANGE UP (ref 27–34)
MCHC RBC-ENTMCNC: 33 GM/DL — SIGNIFICANT CHANGE UP (ref 32–36)
MCV RBC AUTO: 89.2 FL — SIGNIFICANT CHANGE UP (ref 80–100)
METHADONE UR-MCNC: NEGATIVE — SIGNIFICANT CHANGE UP
MONOCYTES # BLD AUTO: 0.88 K/UL — SIGNIFICANT CHANGE UP (ref 0–0.9)
MONOCYTES NFR BLD AUTO: 10.2 % — SIGNIFICANT CHANGE UP (ref 2–14)
NEUTROPHILS # BLD AUTO: 5.37 K/UL — SIGNIFICANT CHANGE UP (ref 1.8–7.4)
NEUTROPHILS NFR BLD AUTO: 62.1 % — SIGNIFICANT CHANGE UP (ref 43–77)
NITRITE UR-MCNC: NEGATIVE — SIGNIFICANT CHANGE UP
NRBC # BLD: 0 /100 WBCS — SIGNIFICANT CHANGE UP (ref 0–0)
NRBC # FLD: 0 K/UL — SIGNIFICANT CHANGE UP (ref 0–0)
OPIATES UR-MCNC: NEGATIVE — SIGNIFICANT CHANGE UP
OXYCODONE UR-MCNC: NEGATIVE — SIGNIFICANT CHANGE UP
PCP SPEC-MCNC: SIGNIFICANT CHANGE UP
PCP UR-MCNC: NEGATIVE — SIGNIFICANT CHANGE UP
PH UR: 5.5 — SIGNIFICANT CHANGE UP (ref 5–8)
PLATELET # BLD AUTO: 284 K/UL — SIGNIFICANT CHANGE UP (ref 150–400)
POTASSIUM SERPL-MCNC: 4.1 MMOL/L — SIGNIFICANT CHANGE UP (ref 3.5–5.3)
POTASSIUM SERPL-SCNC: 4.1 MMOL/L — SIGNIFICANT CHANGE UP (ref 3.5–5.3)
PROT SERPL-MCNC: 7.4 G/DL — SIGNIFICANT CHANGE UP (ref 6–8.3)
PROT UR-MCNC: SIGNIFICANT CHANGE UP MG/DL
RBC # BLD: 4.45 M/UL — SIGNIFICANT CHANGE UP (ref 3.8–5.2)
RBC # FLD: 13.4 % — SIGNIFICANT CHANGE UP (ref 10.3–14.5)
RBC CASTS # UR COMP ASSIST: 1 /HPF — SIGNIFICANT CHANGE UP (ref 0–4)
REVIEW: SIGNIFICANT CHANGE UP
SALICYLATES SERPL-MCNC: <0.3 MG/DL — LOW (ref 15–30)
SARS-COV-2 RNA SPEC QL NAA+PROBE: SIGNIFICANT CHANGE UP
SODIUM SERPL-SCNC: 140 MMOL/L — SIGNIFICANT CHANGE UP (ref 135–145)
SP GR SPEC: 1.03 — HIGH (ref 1–1.03)
SQUAMOUS # UR AUTO: 2 /HPF — SIGNIFICANT CHANGE UP (ref 0–5)
THC UR QL: NEGATIVE — SIGNIFICANT CHANGE UP
TOXICOLOGY SCREEN, DRUGS OF ABUSE, SERUM RESULT: SIGNIFICANT CHANGE UP
TSH SERPL-MCNC: 1.25 UIU/ML — SIGNIFICANT CHANGE UP (ref 0.27–4.2)
UROBILINOGEN FLD QL: 0.2 MG/DL — SIGNIFICANT CHANGE UP (ref 0.2–1)
WBC # BLD: 8.65 K/UL — SIGNIFICANT CHANGE UP (ref 3.8–10.5)
WBC # FLD AUTO: 8.65 K/UL — SIGNIFICANT CHANGE UP (ref 3.8–10.5)
WBC UR QL: 1 /HPF — SIGNIFICANT CHANGE UP (ref 0–5)

## 2024-09-15 PROCEDURE — 99285 EMERGENCY DEPT VISIT HI MDM: CPT

## 2024-09-15 RX ORDER — BUSPIRONE HCL 5 MG
10 TABLET ORAL THREE TIMES A DAY
Refills: 0 | Status: DISCONTINUED | OUTPATIENT
Start: 2024-09-15 | End: 2024-09-16

## 2024-09-15 RX ORDER — BUSPIRONE HCL 5 MG
10 TABLET ORAL THREE TIMES A DAY
Refills: 0 | Status: DISCONTINUED | OUTPATIENT
Start: 2024-09-16 | End: 2024-09-23

## 2024-09-15 RX ORDER — DONEPEZIL HCL 10 MG
5 TABLET ORAL AT BEDTIME
Refills: 0 | Status: DISCONTINUED | OUTPATIENT
Start: 2024-09-15 | End: 2024-09-16

## 2024-09-15 RX ORDER — QUETIAPINE FUMARATE 50 MG/1
50 TABLET, FILM COATED ORAL AT BEDTIME
Refills: 0 | Status: DISCONTINUED | OUTPATIENT
Start: 2024-09-15 | End: 2024-09-16

## 2024-09-15 RX ORDER — MEMANTINE 10 MG/1
10 TABLET ORAL
Refills: 0 | Status: DISCONTINUED | OUTPATIENT
Start: 2024-09-15 | End: 2024-09-16

## 2024-09-15 RX ORDER — MEMANTINE 10 MG/1
10 TABLET ORAL EVERY 12 HOURS
Refills: 0 | Status: DISCONTINUED | OUTPATIENT
Start: 2024-09-16 | End: 2024-10-02

## 2024-09-15 RX ORDER — ESCITALOPRAM OXALATE 10 MG
10 TABLET ORAL DAILY
Refills: 0 | Status: DISCONTINUED | OUTPATIENT
Start: 2024-09-16 | End: 2024-09-17

## 2024-09-15 RX ORDER — QUETIAPINE FUMARATE 50 MG/1
50 TABLET, FILM COATED ORAL AT BEDTIME
Refills: 0 | Status: DISCONTINUED | OUTPATIENT
Start: 2024-09-16 | End: 2024-09-23

## 2024-09-15 RX ORDER — DONEPEZIL HCL 10 MG
5 TABLET ORAL
Refills: 0 | Status: DISCONTINUED | OUTPATIENT
Start: 2024-09-16 | End: 2024-09-25

## 2024-09-15 RX ORDER — OLANZAPINE 2.5 MG
5 TABLET ORAL EVERY 6 HOURS
Refills: 0 | Status: DISCONTINUED | OUTPATIENT
Start: 2024-09-16 | End: 2024-09-27

## 2024-09-15 RX ORDER — ESCITALOPRAM OXALATE 10 MG
10 TABLET ORAL DAILY
Refills: 0 | Status: DISCONTINUED | OUTPATIENT
Start: 2024-09-15 | End: 2024-09-16

## 2024-09-15 RX ORDER — OLANZAPINE 2.5 MG
5 TABLET ORAL ONCE
Refills: 0 | Status: DISCONTINUED | OUTPATIENT
Start: 2024-09-16 | End: 2024-09-24

## 2024-09-15 NOTE — PROVIDER CONTACT NOTE (OTHER) - ASSESSMENT
For collateral information, the writer contacted the patient's family at [phone] and [phone]. No one answered but was able to leave a voicemail.

## 2024-09-15 NOTE — ED BEHAVIORAL HEALTH ASSESSMENT NOTE - NSPRESENTSXS_PSY_ALL_CORE
cognitive disorder/Depressed mood/Anhedonia/Hopelessness or despair/Severe anxiety, agitation or panic
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
## 2024-09-15 NOTE — ED ADULT NURSE NOTE - NSFALLLASTSIX_ED_ALL_ED
PA questions not received on CMM or fax, called [facility] ([phone]) and spoke to Julius who transferred to [facility] department ([phone]), spoke to rep Gonzalez who is unable to fax clinical questions. Initiated new PA over the phone case [id number], clinical question being faxed to [phone]    No.

## 2024-09-15 NOTE — ED BEHAVIORAL HEALTH ASSESSMENT NOTE - MEDICAL ISSUES AND PLAN (INCLUDE STANDING AND PRN MEDICATION)
Alzheimer's dementia - c/w donepezil 5mg BID and memantine 10mg BID. Medical admission labs. Pending UA.

## 2024-09-15 NOTE — ED BEHAVIORAL HEALTH ASSESSMENT NOTE - PSYCHIATRIC ISSUES AND PLAN (INCLUDE STANDING AND PRN MEDICATION)
c/w home meds - Lexapro 10mg/day, buspar 10mg TID, seroquel 50mg qhs. c/w home meds - Lexapro 10mg/day, buspar 10mg TID, seroquel 50mg qhs. For agitation: zyprexa 5mg po/im q6hrs prn

## 2024-09-15 NOTE — ED BEHAVIORAL HEALTH ASSESSMENT NOTE - ACCESS TO FIREARM
Anesthesia ROS/Med Hx    Overall Review:  Pts. EKG was reviewed     Pulmonary Review:    Pt. positive for sleep apnea   The patient is a former smoker.     Cardiovascular Review:  Cardiovascular ROS notes: EKG:  Statements   Normal sinus rhythm   Marked ST abnormality, possible inferior subendocardial injury   Marked ST abnormality, possible anterolateral subendocardial injury   Pt. positive for CAD  Pt. positive for CABG/stent  Pt. positive for angina  Pt. positive for hypertension  Pt. positive for hyperlipidemia  Pt. positive for dysrhythmias - Chronic A-fib    GI/HEPATIC/RENAL Review:    Pt. positive for renal disease - CRI    End/Other Review:    Pt. positive for obesity class I - 30.00 - 34.99      Anesthesia Plan     ASA Status: 3  Anesthesia Type: MAC  Reviewed: Lab Results, Medications, Patient Summary, NPO Status, Problem List, Allergies and Past Med History  The proposed anesthetic plan, including its risks and benefits, have been discussed with the Patient - along with the risks and benefits of alternatives.  Questions were encouraged and answered and the patient and/or representative agrees to proceed.  Blood Products: Not Anticipated  Plan Comments: Propofol gtt      Physical Exam  Mallampati: II  TM Distance: >3 FB  Neck ROM: Full  cardiovascular exam normal  pulmonary exam normal                   No

## 2024-09-15 NOTE — ED ADULT TRIAGE NOTE - NS_BH TRG QUESTION7_ED_ALL_ED
Please call patient to advise them that they are due for their yearly Diabetic Eye exam. Please inquire the following    Name of eye doctor:    Date of last eye exam:    If pt has already had eye exam this year please reach out to eye doctor to obtain the Depression (without Suicidality or Psychosis)/Anxiety (includes Panic, OCD)

## 2024-09-15 NOTE — ED ADULT NURSE NOTE - NSFALLRISKFACTORS_ED_ALL_ED
[address] [place] [address] Kyung Dickerson Pharmacist consult. Mr. Shelly Meza was discharged prior to my consult. I spoke with him today and explained my part of the [facility] team.  I reviewed his discharge medications with him. His BS was 124 this morning. His PCP said he was pre-diabetic and he had controlled with diet before surgery. I told him to discuss with PCP as he would be the one to follow for diabetes. He said he was dizzy this morning and wondered if really needed the metoprolol which had been added to the Prinzide he was already on. He said he had BP machine, but could not use too well. I recommended he not make any changes without talking with MD.  He had completed his 6 days of furosemide and cardiologist recommended he take 6 more days. I told him this, the BP meds or his metformin all could cause dizziness. I recommended he consult MD if dizziness continues. He has had practically no pain since home. He is sleeping on the sofa, but hopes to be in the bed soon. I gave him my cell phone number and asked him to call with any medication questions or issues. He said I could call back any time.   6/26/17 1600 No indicators present

## 2024-09-15 NOTE — ED BEHAVIORAL HEALTH NOTE - BEHAVIORAL HEALTH NOTE
attempted to contact  , at [phone] , left vm.  found  a number in previous chart for home aide Cristiano:, [phone], who answered a call and reports she worked for the pt previously and now fills in whenever the contracted aids are not able to come .   She reports she received a call from a current home aide , Shaila Barnett who apparently called 911 . Cristiano provided the number for Shaila [phone]. attempted to contact  , at [phone] , left vm.  found  a number in previous chart for home aide Cristiano:, [phone], who answered a call and reports she worked for the pt previously and now fills in whenever the contracted aids are not able to come . She reports she saw pt on Wednesday at which time she provided care. She reports she received a call from a current home aide , Shaila [phone] who apparently called 911 . As per Cristiano, Shaila  reported to her , that pt was agitated and violent, and was saying she wanted to jump off the aditi, their back yard has a drop that overlooks  [facility] . Patient does have wandering episode , and has  hx of agitation but is not aware of any physical aggression.  Cristiano provided the number for Shaila [phone]. attempted to contact  , at [phone] , left vm.  found  a number in previous chart for home aide Cristiano:, [phone], who answered a call and reports she worked for the pt previously and now fills in whenever the contracted aids are not able to come . She reports she saw pt on Wednesday at which time she provided care. Cristiano , reports pt is diagnosed with Alzheimer's Dementia, and requires assistance with ADL'S. Pt's  is in . Today , Cristiano  reports she received a call from a current home aide , Shaila [phone] who apparently called 911 . As per Cristiano, Shaila  reported to her , that pt was agitated and violent, and was saying she wanted to jump off the aditi, their back yard has a drop that overlooks  [facility] . Patient does have wandering episode , and has  hx of agitation but is not aware of any physical aggression.  Cristiano provided the number for Shaila [phone]. attempted to contact  , at [phone] , left vm.  found  a number in previous chart for home aide Cristiano:, [phone], who answered a call and reports she worked for the pt previously and now fills in whenever the contracted aids are not able to come . She reports she saw pt on Wednesday at which time she provided care. Cristiano , reports pt is diagnosed with Alzheimer's Dementia, and requires assistance with ADL'S. Pt's  is in . Today , Cristiano  reports she received a call from a current home aide , Shaila [phone] who apparently called 911 . As per Cristiano, Shaila  reported to her , that pt was agitated and violent, and was saying she wanted to jump off the aditi, their back yard has a drop that overlooks  [facility] . Patient does have wandering episode , and has  hx of agitation but is not aware of any physical aggression.  Cristiano provided the number for Shaila [phone].    Spoke with the  , DR. Monterroso , who reports pt has been very sad and depressed , and has been crying . attempted to contact  , at [phone] , left vm.  found  a number in previous chart for home aide Cristiano:, [phone], who answered a call and reports she worked for the pt previously and now fills in whenever the contracted aids are not able to come . She reports she saw pt on Wednesday at which time she provided care. Cristiano , reports pt is diagnosed with Alzheimer's Dementia, and requires assistance with ADL'S. Pt's  is in . Today , Cristiano  reports she received a call from a current home aide , Shaila [phone] who apparently called 911 . As per Cristiano, Shaila  reported to her , that pt was agitated and violent, and was saying she wanted to jump off the aditi, their back yard has a drop that overlooks  [facility] . Patient does have wandering episode , and has  hx of agitation but is not aware of any physical aggression.  Cristiano provided the number for Shaila [phone].    Pt's  called , DR. Monterroso , who reports pt was diagnosed with moderate Alzheimer's Dementia , and is being followed by RUCHI Aleman,  psychiatrist DR. De Paz (didn't have the number for the psychiatrist). He reports pt has been very sad and depressed , and has been crying a lot. He is currently not in [place] , but has bene communicating with the home health aids , including Melanie Linton. He reports he was informed by Shaila that pt has been trying to wander out of the house multiple times in the last week and and yesterday tried to run out and tried to jump off the aditi in their backyard. As per the report he got from the aid pt has been speaking incoherently , not making sense and reporting seeing things as well which he says he had in the past but not to the degree that is being reported to him. Pt apparently has been screaming and yelling that she is seeing things and askign the aid to close the door. Patient also has been experiencing anxiety and she was worked up recently by her PCP and blood was wnl. He is asking that drug screen is done as he reports pt has multiple aids and is concerned that possibly she was given something . Otherwise he is advocating for admission as this is worse than her usual behavior.

## 2024-09-15 NOTE — ED BEHAVIORAL HEALTH ASSESSMENT NOTE - NS ED BHA PLAN ADMIT TO PSYCHIATRY BH CONTACTED FT
will email OP psychiatrist at [facility] left a message with answering service , Kanika , for Dr. Marin at [phone]
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
## 2024-09-15 NOTE — ED ADULT NURSE REASSESSMENT NOTE - NS ED NURSE REASSESS COMMENT FT1
Pt rounding, Pt breathing is equal and nonlabored. Pt is not in any distress or discomfort. pt calm and cooperative at this time. Pt safety maintained. EKG obtained as ordered.

## 2024-09-15 NOTE — ED ADULT NURSE NOTE - OBJECTIVE STATEMENT
pt received to , aox4.  pt coming to ED after her aide called 911.  pt states she wanted to die and was walking at the edge of a aditi in her backyard.  pt states she is "just depressed"  pt calm and cooperative, belongings secured.

## 2024-09-15 NOTE — ED BEHAVIORAL HEALTH NOTE - BEHAVIORAL HEALTH NOTE
For collateral information, the writer contacted the aid, Shaila, at [phone]. The Aid shared that she works today from 3 to 8 p.m. She usually works Monday and Thursday nights and sometimes covers for other people. Shaila describes the patient as having two distinct personalities; yesterday, she was able to hold a conversation and maintain composure, but the next minute, she was on her way out of the house. She approached the aditi and expressed her desire to jump over. The aid mentioned that she has a habit of walking with speed out of the house several times a day. Today, when the aid arrived for her shift following the departure of the previous worker, the patient got up and attempted to leave the house, expressing a desire for a solo walk and a refusal to accept that she couldn't be alone. Today, while the patient was in the shower receiving assistance from the aid, she mentioned that she felt people were watching her, even though only she and the aid were present. Daksha mentions that the patient said previously that she would like to kill her  but doesn't say anything about hurting herself. Daksha also expressed that they mentioned to the , who resides in [place], that the house needs an alarm at the doors because it's a big house with six doors where the patient can easily escape from either door, and he hasn't gotten to it. We expect the  to arrive in 2 1/2 weeks. Today, the patient experienced hallucinations, yelling, cursing, and throwing objects. She struggled to calm herself, leading to the need to call the police. The aid mentioned that the patient was unpredictable. Daksha didn't have her medication with her at the moment, but she did give it to the EMS. She believes the patient shouldn't go back home due to safety concerns until she obtains medication that alleviates her symptoms. She explained that she is not sure who is picking up the patient from the hospital, and the patient receives 24 hours of care. The  has two phone numbers: [phone] and [phone].

## 2024-09-15 NOTE — ED PROVIDER NOTE - OBJECTIVE STATEMENT
75 y/o F hx  Dementia, Anxiety BIBA secondary to agitation and suicidal ideations. EMS reported that patient was standing near  a aditi in her backyard.    Patient presents  calm , admits to felling  depress. Patient Ax0x2., unable to give detail of story.   Denies pain, SOB, fever, chills, chest/abdominal discomfort .  No evidence of physical injuries, broken skin or deformities. Denies use of alochol or illicit drugs.

## 2024-09-15 NOTE — ED BEHAVIORAL HEALTH ASSESSMENT NOTE - RISK ASSESSMENT
acute risks include dementia and poor social support. chronic risks include cognitive disorder. protective factors include no depressive episode, no jd, no psychosis, no substance abuse, no acces to weapons, no suicide attempt, no homicidal ideation,    Patient is an acute risk of harm to self and requires involuntary inpatient psychiatric hospitalization.

## 2024-09-15 NOTE — ED ADULT NURSE NOTE - NSFALLUNIVINTERV_ED_ALL_ED
Bed/Stretcher in lowest position, wheels locked, appropriate side rails in place/Call bell, personal items and telephone in reach/Instruct patient to call for assistance before getting out of bed/chair/stretcher/Non-slip footwear applied when patient is off stretcher/[place] to call system/Physically safe environment - no spills, clutter or unnecessary equipment/Purposeful proactive rounding/Room/bathroom lighting operational, light cord in reach
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
## 2024-09-15 NOTE — ED ADULT TRIAGE NOTE - GLASGOW COMA SCALE: BEST MOTOR RESPONSE, MLM
History and Physical / Pre-Sedation Assessment    Patient:  Robert Bird  :   1949    Intended Procedure: IVC filter placement      HPI: Multiple DVT's, now with contrindication to anticoagulation  Nurses notes reviewed and agreed.  Medications reviewed  Allergies:   Patient has no known allergies.        Physical Exam:   CURRENT VITALS:  BP (!) 145/103   Airway:Normal  Cardiac:Normal  Pulmonary:Normal  Abdomen:Normal        Pre-Procedure Assessment/Plan:  ASA 3 - Patient with moderate systemic disease with functional limitations    Mallampati Airway Assessment:  Mallampati Class II - (soft palate, fauces & uvula are visible)    Level of Sedation Plan:Moderate sedation    Post Procedure plan: Return to same level of care    I assessed the patient and find that the patient is in satisfactory condition to proceed with the planned procedure and sedation plan.    I have explained the risk, benefits, and alternatives to the procedure. The patient understands and agrees to proceed.  Yes    Rene Che        
(M6) obeys commands

## 2024-09-15 NOTE — ED BEHAVIORAL HEALTH ASSESSMENT NOTE - DESCRIPTION
lives in [place] with  and poodle.  travels overseas every 3 weeks. Poor relationship with 2 children. Former teacher. dementia. Vital Signs Last 24 Hrs  T(C): 36.4 (15 Sep 2024 21:30), Max: 36.9 (15 Sep 2024 18:39)  T(F): 97.6 (15 Sep 2024 21:30), Max: 98.5 (15 Sep 2024 18:39)  HR: 59 (15 Sep 2024 21:30) (59 - 70)  BP: 107/63 (15 Sep 2024 21:30) (107/63 - 115/69)  BP(mean): --  RR: 16 (15 Sep 2024 21:30) (16 - 17)  SpO2: 96% (15 Sep 2024 21:30) (96% - 100%)    Parameters below as of 15 Sep 2024 21:30  Patient On (Oxygen Delivery Method): room air
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
## 2024-09-16 DIAGNOSIS — F32.9 MAJOR DEPRESSIVE DISORDER, SINGLE EPISODE, UNSPECIFIED: ICD-10-CM

## 2024-09-16 LAB
SARS-COV-2 IGG+IGM SERPL QL IA: >250 U/ML — HIGH
SARS-COV-2 IGG+IGM SERPL QL IA: POSITIVE

## 2024-09-16 PROCEDURE — 99222 1ST HOSP IP/OBS MODERATE 55: CPT

## 2024-09-16 RX ORDER — INFLUENZA VIRUS VACCINE 15; 15; 15; 15 UG/.5ML; UG/.5ML; UG/.5ML; UG/.5ML
0.5 SUSPENSION INTRAMUSCULAR ONCE
Refills: 0 | Status: DISCONTINUED | OUTPATIENT
Start: 2024-09-16 | End: 2024-10-02

## 2024-09-16 RX ORDER — QUETIAPINE FUMARATE 50 MG/1
50 TABLET, FILM COATED ORAL ONCE
Refills: 0 | Status: COMPLETED | OUTPATIENT
Start: 2024-09-16 | End: 2024-09-16

## 2024-09-16 RX ADMIN — Medication 5 MILLIGRAM(S): at 09:43

## 2024-09-16 RX ADMIN — MEMANTINE 10 MILLIGRAM(S): 10 TABLET ORAL at 20:56

## 2024-09-16 RX ADMIN — Medication 10 MILLIGRAM(S): at 20:57

## 2024-09-16 RX ADMIN — Medication 5 MILLIGRAM(S): at 20:56

## 2024-09-16 RX ADMIN — Medication 10 MILLIGRAM(S): at 09:43

## 2024-09-16 RX ADMIN — QUETIAPINE FUMARATE 50 MILLIGRAM(S): 50 TABLET, FILM COATED ORAL at 20:57

## 2024-09-16 RX ADMIN — Medication 10 MILLIGRAM(S): at 13:03

## 2024-09-16 RX ADMIN — MEMANTINE 10 MILLIGRAM(S): 10 TABLET ORAL at 09:42

## 2024-09-16 RX ADMIN — QUETIAPINE FUMARATE 50 MILLIGRAM(S): 50 TABLET, FILM COATED ORAL at 00:29

## 2024-09-16 NOTE — BH PATIENT PROFILE - NSBHATTSPAN_PSY_A_CORE
Left message for patient to call us back.
Please call Virginie Engel. She is due for her annual wellness exam and should make an appointment. Okay to refill her requested medications for 1 month.
Please see PCP message below and contact patient. RX sent for one month    Okay for reception staff to relay information.
Refill requested for:         pioglitazone (ACTOS) 30 MG tablet 90 tablet 0 8/11/2020     Last office visit:     12/19/19 pleural effusion, 7/22/19 MWV      Please advise on refills.
poor

## 2024-09-16 NOTE — ED BEHAVIORAL HEALTH NOTE - BEHAVIORAL HEALTH NOTE
Outpatient psyhchiatrist ([phone])    No recent concern for SIIP. Complicated home situation and advocating for her to go to a home. APS called in the past for wandering. Confirmed Alzheimers dementia diagnosis with superimposed anxiety and depression. Has been having worsening behavioral problems in the last month. Says patient could benefit from psych admission        Meds:   Lexapro 15mg   Buspar 10mg TID  Seroquel 50mg qHS  Aricept 5mg BID  Memantine 10mg BID    Last seen on 7/29/24 .
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
## 2024-09-16 NOTE — ED ADULT NURSE REASSESSMENT NOTE - NS ED NURSE REASSESS COMMENT FT1
Patient is being discharged today to [address] [place] [address]. Patient verbalize understanding. No complaints/signs/symptoms of pain, distress, or discomfort at this time. patient transported with staff and security, all belongings returned to patient.

## 2024-09-16 NOTE — BH PATIENT PROFILE - NSBHSNSOFSAFETY_PSY_A_CORE
Detail Level: Zone
Patient Specific Counseling (Will Not Stick From Patient To Patient): Continue nystatin powder and hydrocortisone cream\\nRecommend light clothing
sitting quietly with someone

## 2024-09-16 NOTE — BH SOCIAL WORK INITIAL PSYCHOSOCIAL EVALUATION - NSBHLEGALOTHER_PSY_ALL_CORE
The pt's  is a  and states that he and the pt have been legally  for 10 years. He states that he is the pt's health care agent and there is no secondary agent. He states the home is in his name.  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

## 2024-09-16 NOTE — BH PATIENT PROFILE - HOME MEDICATIONS
memantine 5 mg oral tablet , 1 tab(s) orally 2 times a day  donepezil 5 mg oral tablet , 1 tab(s) orally 2 times a day  BuSpar 10 mg oral tablet , 1 tab(s) orally 2 times a day   Rexulti 0.25 mg oral tablet , 1 tab(s) orally once a day test dose call or email or Teams Dr Daniel with cost  memantine 5 mg oral tablet , 1 tab(s) orally 2 times a day  donepezil 5 mg oral tablet , 1 tab(s) orally 2 times a day  BuSpar 10 mg oral tablet , 1 tab(s) orally 2 times a day

## 2024-09-16 NOTE — BH SOCIAL WORK INITIAL PSYCHOSOCIAL EVALUATION - NSBHFINANCE_PSY_ALL_CORE
Pt's  states he pays for most of the cost of running the home and the pt's care and the pt only contributes a small amount.

## 2024-09-16 NOTE — BH INPATIENT PSYCHIATRY ASSESSMENT NOTE - NSBHCHARTREVIEWVS_PSY_A_CORE FT
Vital Signs Last 24 Hrs  T(C): 36.7 (09-16-24 @ 07:57), Max: 36.9 (09-15-24 @ 18:39)  T(F): 98.1 (09-16-24 @ 07:57), Max: 98.5 (09-15-24 @ 18:39)  HR: 65 (09-16-24 @ 00:50) (59 - 70)  BP: 125/85 (09-16-24 @ 00:50) (107/63 - 125/85)  BP(mean): --  RR: 18 (09-16-24 @ 00:50) (16 - 18)  SpO2: 100% (09-16-24 @ 00:50) (96% - 100%)    Orthostatic VS  09-16-24 @ 07:57  Lying BP: --/-- HR: --  Sitting BP: 123/64 HR: 74  Standing BP: 134/63 HR: 90  Site: --  Mode: --
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
## 2024-09-16 NOTE — BH INPATIENT PSYCHIATRY ASSESSMENT NOTE - CURRENT MEDICATION
MEDICATIONS  (STANDING):  busPIRone 10 milliGRAM(s) Oral three times a day  donepezil 5 milliGRAM(s) Oral <User Schedule>  escitalopram 10 milliGRAM(s) Oral daily  influenza  Vaccine (HIGH DOSE) 0.5 milliLiter(s) IntraMuscular once  memantine 10 milliGRAM(s) Oral every 12 hours  QUEtiapine 50 milliGRAM(s) Oral at bedtime    MEDICATIONS  (PRN):  OLANZapine 5 milliGRAM(s) Oral every 6 hours PRN agitation due to psychosis  OLANZapine Injectable 5 milliGRAM(s) IntraMuscular once PRN agitation

## 2024-09-16 NOTE — BH INPATIENT PSYCHIATRY ASSESSMENT NOTE - NSBHMETABOLIC_PSY_ALL_CORE_FT
BMI:   HbA1c:   Glucose:   BP: 125/85 (09-16-24 @ 00:50) (107/63 - 125/85)Vital Signs Last 24 Hrs  T(C): 36.7 (09-16-24 @ 07:57), Max: 36.9 (09-15-24 @ 18:39)  T(F): 98.1 (09-16-24 @ 07:57), Max: 98.5 (09-15-24 @ 18:39)  HR: 65 (09-16-24 @ 00:50) (59 - 70)  BP: 125/85 (09-16-24 @ 00:50) (107/63 - 125/85)  BP(mean): --  RR: 18 (09-16-24 @ 00:50) (16 - 18)  SpO2: 100% (09-16-24 @ 00:50) (96% - 100%)    Orthostatic VS  09-16-24 @ 07:57  Lying BP: --/-- HR: --  Sitting BP: 123/64 HR: 74  Standing BP: 134/63 HR: 90  Site: --  Mode: --    Lipid Panel:
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
## 2024-09-16 NOTE — BH INPATIENT PSYCHIATRY ASSESSMENT NOTE - PAST PSYCHOTROPIC MEDICATION
Buspar 10 mg TID  Memantine 10mg BID  Lexapro 10 mg, Once caily  Seroquel 50 mg, QHS Buspar 10 mg TID, Memantine 10mg BID, Lexapro 10 mg, Once daily, Seroquel 50 mg, QHS  Failed remeron, melatonin, and Trazodone

## 2024-09-16 NOTE — BH SOCIAL WORK INITIAL PSYCHOSOCIAL EVALUATION - NSHIGHRISKBEHFT_PSY_ALL_CORE
The pt has wandered from the home multiple times, stated she wanted to jump off a aditi in her backyard precipitating this admission.

## 2024-09-16 NOTE — BH INPATIENT PSYCHIATRY ASSESSMENT NOTE - NSBHATTESTCOMMENTATTENDFT_PSY_A_CORE
Patient seen by me, chart reviewed, and case discussed with treatment team.  Reviewed and agree with above assessment and plan; corrections and modification made where appropriate.  The patient presents with dementia with behavior disturbance, also with mood symptoms by report.  Will continue Lexapro and increase to 15mg daily.  Calm on the unit thus far, will continue to observe.
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
## 2024-09-16 NOTE — BH INPATIENT PSYCHIATRY ASSESSMENT NOTE - DESCRIPTION
lives in [place] with 24 hr HHA and poodle.  travels overseas every 3 weeks, estranged Poor relationship with 2 children. 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  travels overseas 3 weeks of every month, estranged Poor relationship with 2 children. Former teacher.

## 2024-09-16 NOTE — BH INPATIENT PSYCHIATRY ASSESSMENT NOTE - NSBHCRANIAL_PSY_ALL_CORE
Normal speech (IX, X, XII)/Hearing intact (VIII) Recognizes 2 fingers or can read (II)/Smiles, shows teeth, opens mouth, sticks out tongue (V, VII, XI)/Normal speech (IX, X, XII)/Extraocular Eye Movement Intact  (III, IV, VI)/Shoulder shrug (XI)/Hearing intact (VIII)

## 2024-09-16 NOTE — BH INPATIENT PSYCHIATRY ASSESSMENT NOTE - NSBHATTESTTYPESTAFF_PSY_A_CORE
[FreeTextEntry1] : Assessment:  celiac disease - reportedly on a strict gluten free diet even away at college;\par                                                 -prior lack of resolution of TTG-A and episodes of minor increase but has been only                                                   minimally positive recently and second biopsy without significant findings.\par \par Plan:  continue gluten free diet;\par           obtain standard celiac antibodies;\par           permission obtained for mother to call for results since he will be back at college;\par \par ADDENDUM: 12/5/19  DISCUSSED LAB RESULTS WITH MOTHER.  AEA AND AGA'S ARE ALL NEGATIVE AGAIN AS THEY WERE JUST PRIOR IN MARCH.   TTG-G HAS TURNED NEGATIVE FROM JUST WEAK POSITIVE LAST TIME.  HIS TTG-A IS VIRTUALLY THE SAME AT 5.4 WHERE IT WAS 5.3 LAST TIME WITH <4 NEGATIVE, 4-10 A WEAK POSITIVE AND >10 POSITIVE.   GIVEN ALL THE NUMBERS AND PATTERN THIS IS PROBABLY MOST CONSISTENT WITH A DECENT GLUTEN FREE DIET AND THE ONLY REMAINING POSITIVE VALUES OF TTG-A IS TANTAMOUNT TO NEGATIVE.  I REPORTED THIS TO HIS MOTHER ALWAYS WITH THE CAUTION THAT THE ANTIBODY TESTS ARE VERY INSENSITIVE TO GLUTEN INTAKE AND ONLY TURN POSITIVE AFTER DAYS OF SUBSTANTIAL GLUTEN INTAKE.\par           WE AGREED THAT WE WOULD OBTAIN REPEAT ANTIBODIES IN MARCH WHEN HE IS HOME AND THAT WE WOULD DISCUSS THEM OVER THE PHONE WITHOUT A VISIT. Student

## 2024-09-17 PROCEDURE — 99232 SBSQ HOSP IP/OBS MODERATE 35: CPT

## 2024-09-17 RX ORDER — ESCITALOPRAM OXALATE 10 MG
15 TABLET ORAL DAILY
Refills: 0 | Status: DISCONTINUED | OUTPATIENT
Start: 2024-09-17 | End: 2024-09-26

## 2024-09-17 RX ADMIN — Medication 5 MILLIGRAM(S): at 20:06

## 2024-09-17 RX ADMIN — Medication 10 MILLIGRAM(S): at 20:06

## 2024-09-17 RX ADMIN — Medication 10 MILLIGRAM(S): at 12:37

## 2024-09-17 RX ADMIN — MEMANTINE 10 MILLIGRAM(S): 10 TABLET ORAL at 20:06

## 2024-09-17 RX ADMIN — Medication 15 MILLIGRAM(S): at 09:23

## 2024-09-17 RX ADMIN — Medication 5 MILLIGRAM(S): at 09:23

## 2024-09-17 RX ADMIN — MEMANTINE 10 MILLIGRAM(S): 10 TABLET ORAL at 09:24

## 2024-09-17 RX ADMIN — Medication 5 MILLIGRAM(S): at 15:44

## 2024-09-17 RX ADMIN — Medication 10 MILLIGRAM(S): at 09:24

## 2024-09-17 RX ADMIN — QUETIAPINE FUMARATE 50 MILLIGRAM(S): 50 TABLET, FILM COATED ORAL at 20:05
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
## 2024-09-17 NOTE — BH SAFETY PLAN - WARNING SIGN 1
Patient was unable to participate in safety planning due ot her confusion. Patient initially presented with symptoms of confusion, agitation, anxiety, depression and suicidal ideations.

## 2024-09-17 NOTE — PHYSICAL THERAPY INITIAL EVALUATION ADULT - NSACTIVITYREC_GEN_A_PT
Recommend patient ambulate within unit with supervision secondary to cognitive impairment affecting safety awareness

## 2024-09-17 NOTE — BH INPATIENT PSYCHIATRY PROGRESS NOTE - PRN MEDS
MEDICATIONS  (PRN):  OLANZapine 5 milliGRAM(s) Oral every 6 hours PRN agitation due to psychosis  OLANZapine Injectable 5 milliGRAM(s) IntraMuscular once PRN agitation

## 2024-09-17 NOTE — BH INPATIENT PSYCHIATRY PROGRESS NOTE - NSBHMETABOLIC_PSY_ALL_CORE_FT
BMI:   HbA1c:   Glucose:   BP: 125/85 (09-16-24 @ 00:50) (107/63 - 125/85)Vital Signs Last 24 Hrs  T(C): 36.7 (09-17-24 @ 09:04), Max: 36.7 (09-17-24 @ 09:04)  T(F): 98 (09-17-24 @ 09:04), Max: 98 (09-17-24 @ 09:04)  HR: --  BP: --  BP(mean): --  RR: --  SpO2: --    Orthostatic VS  09-17-24 @ 09:04  Lying BP: --/-- HR: --  Sitting BP: 129/66 HR: 68  Standing BP: 112/74 HR: 92  Site: --  Mode: --  Orthostatic VS  09-16-24 @ 07:57  Lying BP: --/-- HR: --  Sitting BP: 123/64 HR: 74  Standing BP: 134/63 HR: 90  Site: --  Mode: --    Lipid Panel:

## 2024-09-17 NOTE — BH INPATIENT PSYCHIATRY PROGRESS NOTE - NSBHCHARTREVIEWVS_PSY_A_CORE FT
Vital Signs Last 24 Hrs  T(C): 36.7 (09-17-24 @ 09:04), Max: 36.7 (09-17-24 @ 09:04)  T(F): 98 (09-17-24 @ 09:04), Max: 98 (09-17-24 @ 09:04)  HR: --  BP: --  BP(mean): --  RR: --  SpO2: --    Orthostatic VS  09-17-24 @ 09:04  Lying BP: --/-- HR: --  Sitting BP: 129/66 HR: 68  Standing BP: 112/74 HR: 92  Site: --  Mode: --  Orthostatic VS  09-16-24 @ 07:57  Lying BP: --/-- HR: --  Sitting BP: 123/64 HR: 74  Standing BP: 134/63 HR: 90  Site: --  Mode: --
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
## 2024-09-17 NOTE — BH INPATIENT PSYCHIATRY PROGRESS NOTE - CURRENT MEDICATION
MEDICATIONS  (STANDING):  busPIRone 10 milliGRAM(s) Oral three times a day  donepezil 5 milliGRAM(s) Oral <User Schedule>  escitalopram 15 milliGRAM(s) Oral daily  influenza  Vaccine (HIGH DOSE) 0.5 milliLiter(s) IntraMuscular once  memantine 10 milliGRAM(s) Oral every 12 hours  QUEtiapine 50 milliGRAM(s) Oral at bedtime    MEDICATIONS  (PRN):  OLANZapine 5 milliGRAM(s) Oral every 6 hours PRN agitation due to psychosis  OLANZapine Injectable 5 milliGRAM(s) IntraMuscular once PRN agitation

## 2024-09-17 NOTE — BH INPATIENT PSYCHIATRY PROGRESS NOTE - MSE UNSTRUCTURED FT
The patient appears stated age, with fair hygiene, and is dressed appropriately.  She was calm and makes attempts to be cooperative with the interview.  She maintained appropriate eye contact.  No restlessness or slowing of movements observed.  Gait is steady.  The patient’s speech was fluent, sad in tone, normal rate and volume.  The patient’s mood is “not good.”  Her affect is constricted, dysphoric, stable, appropriate.  The patient’s thoughts are disorganized, tangential, loose.  She does not have any clear delusions or hallucinations, but is disoriented.  She does not have any suicidal or homicidal ideation, intent, or plan.  Insight is poor.  Judgment is impaired.  Impulse control has been fair on the unit.
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
## 2024-09-17 NOTE — BH INPATIENT PSYCHIATRY PROGRESS NOTE - NSBHFUPINTERVALHXFT_PSY_A_CORE
Patient seen for follow up of agitation in the context of Alzheimer's dementia, chart reviewed, and case discussed with treatment team.  No events reported overnight.  The patient is dysphoric this morning, tearful at times.  She states she isn't doing well, as her parents both drove off in the car today and she misses them.  She remains confused, oriented only to person.  She denies SI and behavior has been well controlled.  She is visible on the unit, wandering.  She reports eating and sleeping well.  The patient has been compliant with medications, tolerating them well.

## 2024-09-17 NOTE — BH SAFETY PLAN - STEP 5 CRISIS
"  [facility]   Boyd, PM&R Staff Physician    Juan  Sera, DO  PGY3 PM&R Resident     .

## 2024-09-18 PROCEDURE — 99232 SBSQ HOSP IP/OBS MODERATE 35: CPT | Mod: GC

## 2024-09-18 RX ADMIN — MEMANTINE 10 MILLIGRAM(S): 10 TABLET ORAL at 21:32

## 2024-09-18 RX ADMIN — QUETIAPINE FUMARATE 50 MILLIGRAM(S): 50 TABLET, FILM COATED ORAL at 21:32

## 2024-09-18 RX ADMIN — MEMANTINE 10 MILLIGRAM(S): 10 TABLET ORAL at 09:36

## 2024-09-18 RX ADMIN — Medication 5 MILLIGRAM(S): at 21:31

## 2024-09-18 RX ADMIN — Medication 10 MILLIGRAM(S): at 09:36

## 2024-09-18 RX ADMIN — Medication 10 MILLIGRAM(S): at 21:31

## 2024-09-18 RX ADMIN — Medication 15 MILLIGRAM(S): at 09:36

## 2024-09-18 RX ADMIN — Medication 10 MILLIGRAM(S): at 13:00

## 2024-09-18 RX ADMIN — Medication 5 MILLIGRAM(S): at 09:36

## 2024-09-18 NOTE — BH INPATIENT PSYCHIATRY PROGRESS NOTE - NSBHATTESTCOMMENTATTENDFT_PSY_A_CORE
Patient seen by me, chart reviewed, and case discussed with treatment team.  Reviewed and agree with above progress note, assessment and plan; corrections and modification made where appropriate.  The patient continues to be dysphoric, tearful during interview.  She remains confused, disoriented, with poor memory.  Tolerating medications well.  Patient with orthostatic BP, will encourage increased PO - staff to provide more reminders to eat/drink.

## 2024-09-18 NOTE — BH INPATIENT PSYCHIATRY PROGRESS NOTE - NSBHCHARTREVIEWVS_PSY_A_CORE FT
Vital Signs Last 24 Hrs  T(C): 36.7 (09-18-24 @ 08:01), Max: 36.7 (09-18-24 @ 08:01)  T(F): 98.1 (09-18-24 @ 08:01), Max: 98.1 (09-18-24 @ 08:01)  HR: --  BP: --  BP(mean): --  RR: --  SpO2: --    Orthostatic VS  09-18-24 @ 09:16  Lying BP: 132/72 HR: 60  Sitting BP: 130/100 HR: 82  Standing BP: --/-- HR: --  Site: upper right arm  Mode: auscultated w/ stethoscope  Orthostatic VS  09-18-24 @ 08:01  Lying BP: --/-- HR: --  Sitting BP: 135/69 HR: 70  Standing BP: 94/69 HR: 87  Site: --  Mode: --  Orthostatic VS  09-17-24 @ 09:04  Lying BP: --/-- HR: --  Sitting BP: 129/66 HR: 68  Standing BP: 112/74 HR: 92  Site: --  Mode: --

## 2024-09-18 NOTE — BH INPATIENT PSYCHIATRY PROGRESS NOTE - MSE UNSTRUCTURED FT
The patient appears stated age, with fair hygiene, and is dressed appropriately.  She was calm and makes attempts to be cooperative with the interview.  She maintained appropriate eye contact.  No restlessness or slowing of movements observed.  Gait is steady.  The patient’s speech was fluent, sad in tone, and a normal rate but reduced volume.  She has a sad mood with depressed congruent affect.  She has delusional guilt, denies auditory hallucinations and disoriented.  She is suicidal but will not share her plans. She denies HI and  Insight is poor.  Judgment is impaired.  Impulse control has been fair on the unit.

## 2024-09-18 NOTE — BH INPATIENT PSYCHIATRY PROGRESS NOTE - NSBHFUPINTERVALHXFT_PSY_A_CORE
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 Chart reviewed, and case discussed with treatment team.  No events reported overnight.  Pt was sad and tearing throughout assessment, talking sparingly when she can and responding with "I don't know" to most questions. She reports she does not recall names of her children and earlier, the HHA living with her. The only names she could readily recall were Larry, the , and Curt, her dog. She later recalled name of HHA as Stephie and described her as more of a friend who was teaching with her, an age mate, and lives at [place]. She describes tearing that "they" (Family and Stephie) took Curt, who she has had for about 3 years, away from her with concerns that she could not care for Cutr. She reports "everyone deserting her to her fate. She endorsed death wishes, suicidal ideation, and plans, but will not share the plan. She denies ever attempting suicide or receiving mental healthcare in the past. She denies any homicidal ideas and denies that anyone, including , will want to harm her. She reports weight loss of about 20 pounds, poor sleep, delusional guilt, worthlessness, hopelessness, and helplessness. She is compliant with her meds and wants to live for her dog.   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 Chart reviewed, and case discussed with treatment team.  No events reported overnight.  Pt was sad and tearing throughout assessment, talking sparingly when she can and responding with "I don't know" to most questions. She reports she does not recall names of her children and earlier, the HHA living with her. The only names she could readily recall were Larry, the , and Curt, her dog. 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 She describes tearing that "they" (Family and Stephie) took Curt, who she has had for about 3 years, away from her with concerns that she could not care for Curt. She reports "everyone deserting her to her fate. She endorsed death wishes, suicidal ideation, and plans, but will not share the plan. She denies ever attempting suicide or receiving mental healthcare in the past. She denies any homicidal ideas and denies that anyone, including , will want to harm her. She reports weight loss of about 20 pounds, poor sleep, delusional guilt, worthlessness, hopelessness, and helplessness. She is compliant with her meds and wants to live for her dog. Pt is receptive of ECT as she explained "If it will help me."
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
## 2024-09-18 NOTE — BH INPATIENT PSYCHIATRY PROGRESS NOTE - NSBHMETABOLIC_PSY_ALL_CORE_FT
BMI:   HbA1c:   Glucose:   BP: 125/85 (09-16-24 @ 00:50) (107/63 - 125/85)Vital Signs Last 24 Hrs  T(C): 36.7 (09-18-24 @ 08:01), Max: 36.7 (09-18-24 @ 08:01)  T(F): 98.1 (09-18-24 @ 08:01), Max: 98.1 (09-18-24 @ 08:01)  HR: --  BP: --  BP(mean): --  RR: --  SpO2: --    Orthostatic VS  09-18-24 @ 09:16  Lying BP: 132/72 HR: 60  Sitting BP: 130/100 HR: 82  Standing BP: --/-- HR: --  Site: upper right arm  Mode: auscultated w/ stethoscope  Orthostatic VS  09-18-24 @ 08:01  Lying BP: --/-- HR: --  Sitting BP: 135/69 HR: 70  Standing BP: 94/69 HR: 87  Site: --  Mode: --  Orthostatic VS  09-17-24 @ 09:04  Lying BP: --/-- HR: --  Sitting BP: 129/66 HR: 68  Standing BP: 112/74 HR: 92  Site: --  Mode: --    Lipid Panel:

## 2024-09-18 NOTE — BH INPATIENT PSYCHIATRY PROGRESS NOTE - NSBHFUPINTERVALHXFT_PSY_A_CORE
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 Chart reviewed, and case discussed with treatment team.  No events reported overnight.  Pt was sad and tearful throughout assessment, talking sparingly when she can and responding with "I don't know" to most questions. She reports she does not recall names of her children and earlier, the HHA living with her. The only names she could readily recall were Larry, the , and Curt, her dog. 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 She describes tearing that "they" (Family and Stephie) took Curt, who she has had for about 3 years, away from her with concerns that she could not care for Curt. She reports "everyone deserting her to her fate. She endorsed death wishes, suicidal ideation, and plans, but will not share the plan. She denies ever attempting suicide or receiving mental healthcare in the past. She denies any homicidal ideas and denies that anyone, including , will want to harm her. She reports weight loss of about 20 pounds, poor sleep, delusional guilt, worthlessness, hopelessness, and helplessness. She is compliant with her meds and wants to live for her dog. Pt is receptive of ECT as she explained "If it will help me."

## 2024-09-19 PROCEDURE — 99232 SBSQ HOSP IP/OBS MODERATE 35: CPT | Mod: GC

## 2024-09-19 RX ADMIN — Medication 5 MILLIGRAM(S): at 20:17

## 2024-09-19 RX ADMIN — Medication 15 MILLIGRAM(S): at 10:11

## 2024-09-19 RX ADMIN — Medication 10 MILLIGRAM(S): at 20:17

## 2024-09-19 RX ADMIN — Medication 10 MILLIGRAM(S): at 10:11

## 2024-09-19 RX ADMIN — Medication 10 MILLIGRAM(S): at 13:56

## 2024-09-19 RX ADMIN — QUETIAPINE FUMARATE 50 MILLIGRAM(S): 50 TABLET, FILM COATED ORAL at 20:17

## 2024-09-19 RX ADMIN — MEMANTINE 10 MILLIGRAM(S): 10 TABLET ORAL at 20:16

## 2024-09-19 RX ADMIN — MEMANTINE 10 MILLIGRAM(S): 10 TABLET ORAL at 10:11

## 2024-09-19 NOTE — BH INPATIENT PSYCHIATRY PROGRESS NOTE - NSBHCHARTREVIEWVS_PSY_A_CORE FT
Vital Signs Last 24 Hrs  T(C): 36.7 (09-19-24 @ 08:10), Max: 36.7 (09-19-24 @ 08:10)  T(F): 98.1 (09-19-24 @ 08:10), Max: 98.1 (09-19-24 @ 08:10)  HR: --  BP: --  BP(mean): --  RR: --  SpO2: --    Orthostatic VS  09-19-24 @ 08:10  Lying BP: --/-- HR: --  Sitting BP: 100/75 HR: 79  Standing BP: 109/63 HR: 93  Site: --  Mode: --  Orthostatic VS  09-18-24 @ 09:16  Lying BP: 132/72 HR: 60  Sitting BP: 130/82 HR: 100  Standing BP: --/-- HR: --  Site: upper right arm  Mode: auscultated w/ stethoscope  Orthostatic VS  09-18-24 @ 08:01  Lying BP: --/-- HR: --  Sitting BP: 135/69 HR: 70  Standing BP: 94/69 HR: 87  Site: --  Mode: --

## 2024-09-19 NOTE — BH TREATMENT PLAN - NSTXCONFGOALOTHER_PSY_ALL_CORE
Patient with moderate to maximum encouragement will participate in two rehab groups daily within seven days.

## 2024-09-19 NOTE — BH INPATIENT PSYCHIATRY PROGRESS NOTE - NSBHATTESTCOMMENTATTENDFT_PSY_A_CORE
Patient seen by me, chart reviewed, and case discussed with treatment team.  Reviewed and agree with above progress note, assessment and plan; corrections and modification made where appropriate.  Patient remains sad, believing family has taken away her dog and she will never see him again.  Attempted to reality test and re-orient.  Patient remains oriented to person only.  Remains confused. No agitation on the unit.  Will continue current medications.

## 2024-09-19 NOTE — BH TREATMENT PLAN - NSTXDCOTHRGOAL_PSY_ALL_CORE
The recommendation of correction placement on a memory care unit will be made where the pt will have onsite psychiatric f/u. If the pt's  is not accepting of the discharge plan of correction placement, the pt will be discharged home with resumption of private aides 24/7 and outpatient psychiatric f/u with . A referral to APS may be made if the pt returns home.

## 2024-09-19 NOTE — BH INPATIENT PSYCHIATRY PROGRESS NOTE - NSBHFUPINTERVALHXFT_PSY_A_CORE
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 Chart reviewed, and case discussed with treatment team.  No events reported overnight. She refers to her  and friend/HHA as her parents and laments that they took away her dog, Elias to [place], and left her here in the hospital. She is worried she may never be with her dog again. Pt was sad, tearful and sometimes gets agitated during the assessment. She reports "everyone deserting her to her fate." She endorsed death wishes, suicidal ideation, and plans, but will not share the plan. She denies ever attempting suicide or receiving mental healthcare in the past. She denies any homicidal ideas and denies that anyone, including , will want to harm her. She reports worthlessness, hopelessness, and helplessness. She is compliant with her meds and wants to live for her dog. 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 Chart reviewed, and case discussed with treatment team.  No events reported overnight. She refers to her  and friend/HHA as her parents and laments that they took away her dog, Elias to [place], and left her here in the hospital. She is worried she may never be with her dog again. Pt was sad, tearful and sometimes gets agitated during the assessment.  Attempted to reality test and re-orient.  She reports "everyone deserting her to her fate." She endorsed death wishes, suicidal ideation, and plans, but will not share the plan. She denies ever attempting suicide or receiving mental healthcare in the past. She denies any homicidal ideas and denies that anyone, including , will want to harm her. She reports worthlessness, hopelessness, and helplessness. She is compliant with her meds and wants to live for her dog.

## 2024-09-19 NOTE — BH INPATIENT PSYCHIATRY PROGRESS NOTE - MSE UNSTRUCTURED FT
The patient appears stated age, appropriately dressed and well-groomed.  She did not have any abnormal motor activity or psychomotor retardation. Gait is steady.  The patient’s speech was fluent, sad in tone, and a normal rate and volume.  She has a sad mood with depressed congruent affect.  She has delusional guilt, denies auditory hallucinations and she is disoriented x3.  She is suicidal but will not share her plans. She denies HI and  Insight is poor.  Judgment is impaired.  Impulse control has been fair on the unit. The patient appears stated age, appropriately dressed and well-groomed.  She did not have any abnormal motor activity or psychomotor retardation. Gait is steady.  The patient’s speech was fluent, sad in tone, and a normal rate and volume.  She has a sad mood with depressed congruent affect.  Thoughts remain disorganized.  She has delusional guilt, denies auditory hallucinations and she is oriented x1 only.  She admits to suicidal ideation, but will not share her plans. She denies HI and  Insight is poor.  Judgment is impaired.  Impulse control has been fair on the unit.

## 2024-09-19 NOTE — BH INPATIENT PSYCHIATRY PROGRESS NOTE - NSBHMETABOLIC_PSY_ALL_CORE_FT
BMI:   HbA1c:   Glucose:   BP: --Vital Signs Last 24 Hrs  T(C): 36.7 (09-19-24 @ 08:10), Max: 36.7 (09-19-24 @ 08:10)  T(F): 98.1 (09-19-24 @ 08:10), Max: 98.1 (09-19-24 @ 08:10)  HR: --  BP: --  BP(mean): --  RR: --  SpO2: --    Orthostatic VS  09-19-24 @ 08:10  Lying BP: --/-- HR: --  Sitting BP: 100/75 HR: 79  Standing BP: 109/63 HR: 93  Site: --  Mode: --  Orthostatic VS  09-18-24 @ 09:16  Lying BP: 132/72 HR: 60  Sitting BP: 130/82 HR: 100  Standing BP: --/-- HR: --  Site: upper right arm  Mode: auscultated w/ stethoscope  Orthostatic VS  09-18-24 @ 08:01  Lying BP: --/-- HR: --  Sitting BP: 135/69 HR: 70  Standing BP: 94/69 HR: 87  Site: --  Mode: --    Lipid Panel:
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
## 2024-09-19 NOTE — BH TREATMENT PLAN - NSTXCONFINTERPR_PSY_ALL_CORE
Rehab activities will be utilized daily to decrease patient's anxiety and to increase her frustration tolerance within seven days.

## 2024-09-19 NOTE — BH TREATMENT PLAN - NSTXDCOTHRINTERSW_PSY_ALL_CORE
SW will provide pt support and orientation and encourage participation in unit milieu. SW maintain contact with pt's , providing psychoeducation about dementia and addressing recommendation of [facility] placement. SW will speak with pt's private aides for collateral information regarding the pt's functioning and home safety concerns.

## 2024-09-20 PROCEDURE — 99232 SBSQ HOSP IP/OBS MODERATE 35: CPT | Mod: GC

## 2024-09-20 RX ADMIN — MEMANTINE 10 MILLIGRAM(S): 10 TABLET ORAL at 20:34

## 2024-09-20 RX ADMIN — MEMANTINE 10 MILLIGRAM(S): 10 TABLET ORAL at 08:56

## 2024-09-20 RX ADMIN — Medication 10 MILLIGRAM(S): at 08:58

## 2024-09-20 RX ADMIN — Medication 15 MILLIGRAM(S): at 08:56

## 2024-09-20 RX ADMIN — Medication 10 MILLIGRAM(S): at 12:30

## 2024-09-20 RX ADMIN — Medication 5 MILLIGRAM(S): at 09:32

## 2024-09-20 RX ADMIN — Medication 10 MILLIGRAM(S): at 20:33

## 2024-09-20 RX ADMIN — QUETIAPINE FUMARATE 50 MILLIGRAM(S): 50 TABLET, FILM COATED ORAL at 20:33

## 2024-09-20 RX ADMIN — Medication 5 MILLIGRAM(S): at 22:50

## 2024-09-20 RX ADMIN — Medication 5 MILLIGRAM(S): at 13:08

## 2024-09-20 RX ADMIN — Medication 5 MILLIGRAM(S): at 21:04

## 2024-09-20 NOTE — BH INPATIENT PSYCHIATRY PROGRESS NOTE - MSE UNSTRUCTURED FT
The patient appears stated age, appropriately dressed and well-groomed.  She expressed some psychomotor retardation. Gait is steady.  The patient’s speech was fluent, sad in tone, and a normal rate and volume.  She has a sad mood with depressed congruent affect.  Thoughts remain disorganized, sometimes tangential.  She has delusional guilt, denies auditory hallucinations and she is oriented x1 only. She denies SI/HI and  Insight is poor.  Judgment is impaired.  Impulse control has been fair on the unit.

## 2024-09-20 NOTE — BH INPATIENT PSYCHIATRY PROGRESS NOTE - NSBHATTESTCOMMENTATTENDFT_PSY_A_CORE
Patient seen by me, chart reviewed, and case discussed with treatment team.  Reviewed and agree with above progress note, assessment and plan; corrections and modification made where appropriate.  The patient continues to be confused, intermittently sad, tearful.  However, she has been less sad overall.  Social with peers at times.  Eating and sleeping better.  Tolerating medications well, will continue.

## 2024-09-20 NOTE — BH INPATIENT PSYCHIATRY PROGRESS NOTE - NSBHMETABOLIC_PSY_ALL_CORE_FT
BMI:   HbA1c:   Glucose:   BP: --Vital Signs Last 24 Hrs  T(C): 36.4 (09-20-24 @ 08:06), Max: 36.4 (09-20-24 @ 08:06)  T(F): 97.6 (09-20-24 @ 08:06), Max: 97.6 (09-20-24 @ 08:06)  HR: --  BP: --  BP(mean): --  RR: --  SpO2: --    Orthostatic VS  09-20-24 @ 08:06  Lying BP: --/-- HR: --  Sitting BP: 130/66 HR: 79  Standing BP: --/-- HR: --  Site: upper left arm  Mode: electronic  Orthostatic VS  09-19-24 @ 08:10  Lying BP: --/-- HR: --  Sitting BP: 100/75 HR: 79  Standing BP: 109/63 HR: 93  Site: --  Mode: --    Lipid Panel:
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
## 2024-09-20 NOTE — BH INPATIENT PSYCHIATRY PROGRESS NOTE - NSBHCHARTREVIEWVS_PSY_A_CORE FT
Vital Signs Last 24 Hrs  T(C): 36.4 (09-20-24 @ 08:06), Max: 36.4 (09-20-24 @ 08:06)  T(F): 97.6 (09-20-24 @ 08:06), Max: 97.6 (09-20-24 @ 08:06)  HR: --  BP: --  BP(mean): --  RR: --  SpO2: --    Orthostatic VS  09-20-24 @ 08:06  Lying BP: --/-- HR: --  Sitting BP: 130/66 HR: 79  Standing BP: --/-- HR: --  Site: upper left arm  Mode: electronic  Orthostatic VS  09-19-24 @ 08:10  Lying BP: --/-- HR: --  Sitting BP: 100/75 HR: 79  Standing BP: 109/63 HR: 93  Site: --  Mode: --

## 2024-09-21 RX ADMIN — QUETIAPINE FUMARATE 50 MILLIGRAM(S): 50 TABLET, FILM COATED ORAL at 20:40

## 2024-09-21 RX ADMIN — MEMANTINE 10 MILLIGRAM(S): 10 TABLET ORAL at 10:03

## 2024-09-21 RX ADMIN — Medication 15 MILLIGRAM(S): at 10:03

## 2024-09-21 RX ADMIN — Medication 10 MILLIGRAM(S): at 13:18

## 2024-09-21 RX ADMIN — MEMANTINE 10 MILLIGRAM(S): 10 TABLET ORAL at 20:41

## 2024-09-21 RX ADMIN — Medication 10 MILLIGRAM(S): at 10:04

## 2024-09-21 RX ADMIN — Medication 10 MILLIGRAM(S): at 20:40

## 2024-09-21 RX ADMIN — Medication 5 MILLIGRAM(S): at 20:40

## 2024-09-21 RX ADMIN — Medication 5 MILLIGRAM(S): at 17:36

## 2024-09-22 RX ADMIN — Medication 10 MILLIGRAM(S): at 08:17

## 2024-09-22 RX ADMIN — Medication 5 MILLIGRAM(S): at 08:17

## 2024-09-22 RX ADMIN — MEMANTINE 10 MILLIGRAM(S): 10 TABLET ORAL at 08:17

## 2024-09-22 RX ADMIN — Medication 15 MILLIGRAM(S): at 08:17

## 2024-09-22 RX ADMIN — MEMANTINE 10 MILLIGRAM(S): 10 TABLET ORAL at 20:32

## 2024-09-22 RX ADMIN — Medication 10 MILLIGRAM(S): at 20:32

## 2024-09-22 RX ADMIN — QUETIAPINE FUMARATE 50 MILLIGRAM(S): 50 TABLET, FILM COATED ORAL at 20:32

## 2024-09-22 RX ADMIN — Medication 5 MILLIGRAM(S): at 20:32

## 2024-09-22 RX ADMIN — Medication 10 MILLIGRAM(S): at 12:43

## 2024-09-23 PROCEDURE — 99232 SBSQ HOSP IP/OBS MODERATE 35: CPT | Mod: GC

## 2024-09-23 RX ORDER — BUSPIRONE HCL 5 MG
10 TABLET ORAL
Refills: 0 | Status: DISCONTINUED | OUTPATIENT
Start: 2024-09-23 | End: 2024-09-24

## 2024-09-23 RX ADMIN — Medication 5 MILLIGRAM(S): at 11:34

## 2024-09-23 RX ADMIN — Medication 5 MILLIGRAM(S): at 23:32

## 2024-09-23 RX ADMIN — Medication 15 MILLIGRAM(S): at 09:16

## 2024-09-23 RX ADMIN — Medication 10 MILLIGRAM(S): at 12:16

## 2024-09-23 RX ADMIN — Medication 5 MILLIGRAM(S): at 21:18

## 2024-09-23 RX ADMIN — Medication 10 MILLIGRAM(S): at 09:16

## 2024-09-23 RX ADMIN — Medication 10 MILLIGRAM(S): at 21:16

## 2024-09-23 RX ADMIN — Medication 5 MILLIGRAM(S): at 09:16

## 2024-09-23 RX ADMIN — MEMANTINE 10 MILLIGRAM(S): 10 TABLET ORAL at 09:17

## 2024-09-23 RX ADMIN — MEMANTINE 10 MILLIGRAM(S): 10 TABLET ORAL at 21:16

## 2024-09-23 NOTE — BH INPATIENT PSYCHIATRY PROGRESS NOTE - NSBHATTESTCOMMENTATTENDFT_PSY_A_CORE
See fellow note for details. I saw and evaluated patient. I agree with assessment and plan of  fellow. Patient presents as dysphoric along with sig cog impairment. Cont Lexapro consider changing to Abilify for augmentation taper off Buspar

## 2024-09-23 NOTE — BH INPATIENT PSYCHIATRY PROGRESS NOTE - NSBHCHARTREVIEWVS_PSY_A_CORE FT
Vital Signs Last 24 Hrs  T(C): 36.3 (09-23-24 @ 07:47), Max: 36.3 (09-23-24 @ 07:47)  T(F): 97.3 (09-23-24 @ 07:47), Max: 97.3 (09-23-24 @ 07:47)  HR: --  BP: --  BP(mean): --  RR: --  SpO2: --    Orthostatic VS  09-23-24 @ 07:47  Lying BP: 134/57 HR: 68  Sitting BP: 108/62 HR: 72  Standing BP: --/-- HR: --  Site: --  Mode: --  Orthostatic VS  09-22-24 @ 08:12  Lying BP: --/-- HR: --  Sitting BP: 140/72 HR: 72  Standing BP: 128/71 HR: 85  Site: --  Mode: --   Vital Signs Last 24 Hrs  T(C): 36.3 (09-23-24 @ 07:47), Max: 36.3 (09-23-24 @ 07:47)  T(F): 97.3 (09-23-24 @ 07:47), Max: 97.3 (09-23-24 @ 07:47)  HR: --  BP: --  BP(mean): --  RR: --  SpO2: --    Orthostatic VS  09-23-24 @ 09:52  Lying BP: --/-- HR: --  Sitting BP: 115/67 HR: 73  Standing BP: 107/75 HR: 73  Site: --  Mode: --  Orthostatic VS  09-23-24 @ 07:47  Lying BP: 134/57 HR: 68  Sitting BP: 108/62 HR: 72  Standing BP: --/-- HR: --  Site: --  Mode: --  Orthostatic VS  09-22-24 @ 08:12  Lying BP: --/-- HR: --  Sitting BP: 140/72 HR: 72  Standing BP: 128/71 HR: 85  Site: --  Mode: --

## 2024-09-23 NOTE — BH INPATIENT PSYCHIATRY PROGRESS NOTE - NSBHMETABOLIC_PSY_ALL_CORE_FT
BMI:   HbA1c:   Glucose:   BP: --Vital Signs Last 24 Hrs  T(C): 36.3 (09-23-24 @ 07:47), Max: 36.3 (09-23-24 @ 07:47)  T(F): 97.3 (09-23-24 @ 07:47), Max: 97.3 (09-23-24 @ 07:47)  HR: --  BP: --  BP(mean): --  RR: --  SpO2: --    Orthostatic VS  09-23-24 @ 07:47  Lying BP: 134/57 HR: 68  Sitting BP: 108/62 HR: 72  Standing BP: --/-- HR: --  Site: --  Mode: --  Orthostatic VS  09-22-24 @ 08:12  Lying BP: --/-- HR: --  Sitting BP: 140/72 HR: 72  Standing BP: 128/71 HR: 85  Site: --  Mode: --    Lipid Panel:  BMI:   HbA1c:   Glucose:   BP: --Vital Signs Last 24 Hrs  T(C): 36.3 (09-23-24 @ 07:47), Max: 36.3 (09-23-24 @ 07:47)  T(F): 97.3 (09-23-24 @ 07:47), Max: 97.3 (09-23-24 @ 07:47)  HR: --  BP: --  BP(mean): --  RR: --  SpO2: --    Orthostatic VS  09-23-24 @ 09:52  Lying BP: --/-- HR: --  Sitting BP: 115/67 HR: 73  Standing BP: 107/75 HR: 73  Site: --  Mode: --  Orthostatic VS  09-23-24 @ 07:47  Lying BP: 134/57 HR: 68  Sitting BP: 108/62 HR: 72  Standing BP: --/-- HR: --  Site: --  Mode: --  Orthostatic VS  09-22-24 @ 08:12  Lying BP: --/-- HR: --  Sitting BP: 140/72 HR: 72  Standing BP: 128/71 HR: 85  Site: --  Mode: --    Lipid Panel:

## 2024-09-23 NOTE — BH INPATIENT PSYCHIATRY PROGRESS NOTE - CURRENT MEDICATION
MEDICATIONS  (STANDING):  busPIRone 10 milliGRAM(s) Oral three times a day  donepezil 5 milliGRAM(s) Oral <User Schedule>  escitalopram 15 milliGRAM(s) Oral daily  influenza  Vaccine (HIGH DOSE) 0.5 milliLiter(s) IntraMuscular once  memantine 10 milliGRAM(s) Oral every 12 hours  QUEtiapine 50 milliGRAM(s) Oral at bedtime    MEDICATIONS  (PRN):  OLANZapine 5 milliGRAM(s) Oral every 6 hours PRN agitation due to psychosis  OLANZapine Injectable 5 milliGRAM(s) IntraMuscular once PRN agitation   MEDICATIONS  (STANDING):  busPIRone 10 milliGRAM(s) Oral two times a day  donepezil 5 milliGRAM(s) Oral <User Schedule>  escitalopram 15 milliGRAM(s) Oral daily  influenza  Vaccine (HIGH DOSE) 0.5 milliLiter(s) IntraMuscular once  memantine 10 milliGRAM(s) Oral every 12 hours    MEDICATIONS  (PRN):  OLANZapine 5 milliGRAM(s) Oral every 6 hours PRN agitation due to psychosis  OLANZapine Injectable 5 milliGRAM(s) IntraMuscular once PRN agitation
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
## 2024-09-23 NOTE — BH INPATIENT PSYCHIATRY PROGRESS NOTE - MSE UNSTRUCTURED FT
The patient appears stated age, appropriately dressed and fairly groomed.  She expressed some psychomotor retardation. Gait is steady.  The patient’s speech was fluent, sad in tone, but with a normal rate and volume.  She has a sad mood and tears during assessment with a depressed congruent affect.  Thoughts remain disorganized, sometimes tangential.  She has delusional guilt and worthlessness but denies auditory hallucinations and she is oriented only to person. She denies SI/HI and  Insight is poor.  Judgment is impaired.  Impulse control has been fair on the unit.

## 2024-09-23 NOTE — BH INPATIENT PSYCHIATRY PROGRESS NOTE - NSBHFUPINTERVALHXFT_PSY_A_CORE
75 y/o F seen for Severe Depressive Episode with mood congruent psychosis in the context of Early onset Alzheimer's dementia with behavior disturbance. Chart reviewed, and case discussed with treatment team.  She reports difficulty falling asleep and waking up earlier than intended. Pt endorses sad moods, worthlessness; "they (family) see me as a dumbo", decreased energy, and loss of interest, but denies suicidal thoughts or death wishes. She denies dizziness or fainting spells though her vitals show orthostasis today of about 25 mmHg drop. She admits she is not able to care for her dog in her current situation, but wishes to get better and be able to care for the dog, Curt. She shared that the  told her some 10 years ago that she was not going to get anything from him, and that is why he has come to leave him here. She shared that she believes the  does not want her again.  She feeds herself now, but still needs to be redirected or encouraged to perform ADLs in the unit. She takes her meds with some persuasion. 75 y/o F seen for Severe Depressive Episode with mood congruent psychosis in the context of Early onset Alzheimer's dementia with behavior disturbance. Chart reviewed, and case discussed with treatment team.  She reports difficulty falling asleep and waking up earlier than intended. Pt endorses sad moods, worthlessness; "they (family) see me as a dumbo", decreased energy, and loss of interest, but denies suicidal thoughts or death wishes. She denies dizziness or fainting spells though her vitals show orthostasis today of about 25 mmHg drop which normalized 30 min later when re-checked. She admits she is not able to care for her dog in her current situation, but wishes to get better and be able to care for the dog, Curt. She shared that the  told her some 10 years ago that she was not going to get anything from him, and that is why he has come to leave him here. She shared that she believes the  does not want her again.  She feeds herself now, but still needs to be redirected or encouraged to perform ADLs in the unit. She takes her meds with some persuasion. 75 y/o F seen for Severe Depressive Episode in the context of Early onset Alzheimer's dementia with behavior disturbance. Chart reviewed, and case discussed with treatment team.  She reports difficulty falling asleep and waking up earlier than intended. Pt endorses sad moods, worthlessness; "they (family) see me as a dumbo", decreased energy, and loss of interest, but denies suicidal thoughts or death wishes. She denies dizziness or fainting spells though her vitals show orthostasis today of about 25 mmHg drop which normalized 30 min later when re-checked. She admits she is not able to care for her dog in her current situation, but wishes to get better and be able to care for the dog, Curt. She shared that the  told her some 10 years ago that she was not going to get anything from him, and that is why he has come to leave him here. She shared that she believes the  does not want her again.  She feeds herself now, but still needs to be redirected or encouraged to perform ADLs in the unit. She takes her meds with some persuasion.

## 2024-09-24 PROCEDURE — 99232 SBSQ HOSP IP/OBS MODERATE 35: CPT | Mod: GC

## 2024-09-24 RX ORDER — QUETIAPINE FUMARATE 50 MG/1
25 TABLET, FILM COATED ORAL DAILY
Refills: 0 | Status: DISCONTINUED | OUTPATIENT
Start: 2024-09-24 | End: 2024-09-27

## 2024-09-24 RX ORDER — QUETIAPINE FUMARATE 50 MG/1
50 TABLET, FILM COATED ORAL AT BEDTIME
Refills: 0 | Status: DISCONTINUED | OUTPATIENT
Start: 2024-09-24 | End: 2024-09-26

## 2024-09-24 RX ORDER — BREXPIPRAZOLE 2 MG/1
1 TABLET ORAL
Qty: 30 | Refills: 0
Start: 2024-09-24 | End: 2024-10-23

## 2024-09-24 RX ORDER — OLANZAPINE 2.5 MG
2.5 TABLET ORAL ONCE
Refills: 0 | Status: DISCONTINUED | OUTPATIENT
Start: 2024-09-24 | End: 2024-10-02

## 2024-09-24 RX ADMIN — MEMANTINE 10 MILLIGRAM(S): 10 TABLET ORAL at 08:49

## 2024-09-24 RX ADMIN — Medication 5 MILLIGRAM(S): at 08:48

## 2024-09-24 RX ADMIN — Medication 5 MILLIGRAM(S): at 20:45

## 2024-09-24 RX ADMIN — Medication 15 MILLIGRAM(S): at 08:48

## 2024-09-24 RX ADMIN — QUETIAPINE FUMARATE 25 MILLIGRAM(S): 50 TABLET, FILM COATED ORAL at 17:37

## 2024-09-24 RX ADMIN — QUETIAPINE FUMARATE 50 MILLIGRAM(S): 50 TABLET, FILM COATED ORAL at 20:45

## 2024-09-24 RX ADMIN — MEMANTINE 10 MILLIGRAM(S): 10 TABLET ORAL at 20:45

## 2024-09-24 RX ADMIN — Medication 5 MILLIGRAM(S): at 10:41

## 2024-09-24 NOTE — BH INPATIENT PSYCHIATRY PROGRESS NOTE - CURRENT MEDICATION
MEDICATIONS  (STANDING):  donepezil 5 milliGRAM(s) Oral <User Schedule>  escitalopram 15 milliGRAM(s) Oral daily  influenza  Vaccine (HIGH DOSE) 0.5 milliLiter(s) IntraMuscular once  memantine 10 milliGRAM(s) Oral every 12 hours  QUEtiapine 50 milliGRAM(s) Oral at bedtime    MEDICATIONS  (PRN):  OLANZapine 5 milliGRAM(s) Oral every 6 hours PRN agitation due to psychosis  OLANZapine Injectable 2.5 milliGRAM(s) IntraMuscular once PRN agitation  QUEtiapine 25 milliGRAM(s) Oral daily PRN Agitation/Insomnia

## 2024-09-24 NOTE — BH INPATIENT PSYCHIATRY PROGRESS NOTE - NSBHCHARTREVIEWVS_PSY_A_CORE FT
Vital Signs Last 24 Hrs  T(C): 36.4 (09-25-24 @ 08:10), Max: 36.4 (09-25-24 @ 08:10)  T(F): 97.5 (09-25-24 @ 08:10), Max: 97.5 (09-25-24 @ 08:10)  HR: --  BP: --  BP(mean): --  RR: --  SpO2: --    Orthostatic VS  09-25-24 @ 08:10  Lying BP: --/-- HR: --  Sitting BP: 131/66 HR: 70  Standing BP: 118/61 HR: 88  Site: upper right arm  Mode: electronic  Orthostatic VS  09-24-24 @ 07:58  Lying BP: --/-- HR: --  Sitting BP: 143/72 HR: 71  Standing BP: 130/69 HR: 80  Site: --  Mode: --

## 2024-09-24 NOTE — BH INPATIENT PSYCHIATRY PROGRESS NOTE - MSE UNSTRUCTURED FT
The patient appears her stated age, appropriately dressed and fairly groomed.  She expressed psychomotor retardation but with a steady gait.  She is disoriented x3. Her speech is spontaneous, and coherent but irrelevant sometimes, and has normal volume and rate.  She denies sad mood though she has a depressed affect.  Her thoughts are tangential sometimes with some perseveration. She denies SI/HI and  Insight is poor.  Judgment is impaired.  Impulse control has been fair on the unit.

## 2024-09-24 NOTE — BH INPATIENT PSYCHIATRY PROGRESS NOTE - NSBHCHARTREVIEWVS_PSY_A_CORE FT
Vital Signs Last 24 Hrs  T(C): 36.4 (09-24-24 @ 07:58), Max: 36.4 (09-24-24 @ 07:58)  T(F): 97.5 (09-24-24 @ 07:58), Max: 97.5 (09-24-24 @ 07:58)  HR: --  BP: --  BP(mean): --  RR: --  SpO2: --    Orthostatic VS  09-24-24 @ 07:58  Lying BP: --/-- HR: --  Sitting BP: 143/72 HR: 71  Standing BP: 130/69 HR: 80  Site: --  Mode: --  Orthostatic VS  09-23-24 @ 09:52  Lying BP: --/-- HR: --  Sitting BP: 115/67 HR: 73  Standing BP: 107/75 HR: 73  Site: --  Mode: --  Orthostatic VS  09-23-24 @ 07:47  Lying BP: 134/57 HR: 68  Sitting BP: 108/62 HR: 72  Standing BP: --/-- HR: --  Site: --  Mode: --

## 2024-09-24 NOTE — BH INPATIENT PSYCHIATRY PROGRESS NOTE - PRN MEDS
MEDICATIONS  (PRN):  OLANZapine 5 milliGRAM(s) Oral every 6 hours PRN agitation due to psychosis  OLANZapine Injectable 2.5 milliGRAM(s) IntraMuscular once PRN agitation  QUEtiapine 25 milliGRAM(s) Oral daily PRN Agitation/Insomnia
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
## 2024-09-24 NOTE — BH INPATIENT PSYCHIATRY PROGRESS NOTE - CURRENT MEDICATION
MEDICATIONS  (STANDING):  donepezil 10 milliGRAM(s) Oral daily  escitalopram 15 milliGRAM(s) Oral daily  influenza  Vaccine (HIGH DOSE) 0.5 milliLiter(s) IntraMuscular once  memantine 10 milliGRAM(s) Oral every 12 hours  QUEtiapine 50 milliGRAM(s) Oral at bedtime    MEDICATIONS  (PRN):  OLANZapine 5 milliGRAM(s) Oral every 6 hours PRN agitation due to psychosis  OLANZapine Injectable 2.5 milliGRAM(s) IntraMuscular once PRN agitation  QUEtiapine 25 milliGRAM(s) Oral daily PRN Agitation/Insomnia

## 2024-09-24 NOTE — BH INPATIENT PSYCHIATRY PROGRESS NOTE - NSBHMETABOLIC_PSY_ALL_CORE_FT
BMI:   HbA1c:   Glucose:   BP: --Vital Signs Last 24 Hrs  T(C): 36.4 (09-24-24 @ 07:58), Max: 36.4 (09-24-24 @ 07:58)  T(F): 97.5 (09-24-24 @ 07:58), Max: 97.5 (09-24-24 @ 07:58)  HR: --  BP: --  BP(mean): --  RR: --  SpO2: --    Orthostatic VS  09-24-24 @ 07:58  Lying BP: --/-- HR: --  Sitting BP: 143/72 HR: 71  Standing BP: 130/69 HR: 80  Site: --  Mode: --  Orthostatic VS  09-23-24 @ 09:52  Lying BP: --/-- HR: --  Sitting BP: 115/67 HR: 73  Standing BP: 107/75 HR: 73  Site: --  Mode: --  Orthostatic VS  09-23-24 @ 07:47  Lying BP: 134/57 HR: 68  Sitting BP: 108/62 HR: 72  Standing BP: --/-- HR: --  Site: --  Mode: --    Lipid Panel:
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
## 2024-09-24 NOTE — BH INPATIENT PSYCHIATRY PROGRESS NOTE - MSE UNSTRUCTURED FT
The patient appears stated age, appropriately dressed and fairly groomed.  She has  some psychomotor retardation. Gait is steady.  The patient’s speech was fluent, sad in tone, but with a normal rate and volume.  She has a sad mood and tears during assessment with a depressed congruent affect.  Thoughts remain disorganized, sometimes tangential. Has reduced self esteem, pessimism focused on upset over current living arrangements. She denies SI/HI and  Insight is poor.  Judgment is impaired.  Impulse control has been fair on the unit. MMSE  results pending. Says she is looking for parents, they are in their 50's she is in her 30's cannot name parents or children.

## 2024-09-24 NOTE — BH INPATIENT PSYCHIATRY PROGRESS NOTE - NSBHFUPINTERVALHXFT_PSY_A_CORE
75 y/o F seen for Severe Depressive Episode in the context of Early onset Alzheimer's dementia with behavior disturbance. Chart reviewed and discussed with team. No overnight events. Pt reports she does not want to communicate with  on phone or wants his visit. She did not tear today and more verbal with a brighter affect. She provides more meaningful responses today but sometimes irrelevant and perseveres. She is also noted to confabulate or inconsistent with her responses as she denies depressed mood or ever being suicidal. She denies irritable or elated mood, increased energy, pressured thought, or decreased need for sleep. She denies any homicidal ideation and did not express any safety concerns in the unit. She is compliant with her meds and eating/feeding herself.

## 2024-09-24 NOTE — BH INPATIENT PSYCHIATRY PROGRESS NOTE - NSBHFUPINTERVALHXFT_PSY_A_CORE
Patient seen for dementia with depression. Patient sleeping poorly wandering into other's rooms taking others items. Patient eating fair. VSS. Patient seen wandering on unit at times weepy.

## 2024-09-24 NOTE — BH INPATIENT PSYCHIATRY PROGRESS NOTE - NSBHMETABOLIC_PSY_ALL_CORE_FT
BMI:   HbA1c:   Glucose:   BP: --Vital Signs Last 24 Hrs  T(C): 36.4 (09-25-24 @ 08:10), Max: 36.4 (09-25-24 @ 08:10)  T(F): 97.5 (09-25-24 @ 08:10), Max: 97.5 (09-25-24 @ 08:10)  HR: --  BP: --  BP(mean): --  RR: --  SpO2: --    Orthostatic VS  09-25-24 @ 08:10  Lying BP: --/-- HR: --  Sitting BP: 131/66 HR: 70  Standing BP: 118/61 HR: 88  Site: upper right arm  Mode: electronic  Orthostatic VS  09-24-24 @ 07:58  Lying BP: --/-- HR: --  Sitting BP: 143/72 HR: 71  Standing BP: 130/69 HR: 80  Site: --  Mode: --    Lipid Panel:

## 2024-09-25 DIAGNOSIS — R07.9 CHEST PAIN, UNSPECIFIED: ICD-10-CM

## 2024-09-25 DIAGNOSIS — F03.90 UNSPECIFIED DEMENTIA, UNSPECIFIED SEVERITY, WITHOUT BEHAVIORAL DISTURBANCE, PSYCHOTIC DISTURBANCE, MOOD DISTURBANCE, AND ANXIETY: ICD-10-CM

## 2024-09-25 LAB
FOLATE SERPL-MCNC: 12 NG/ML — SIGNIFICANT CHANGE UP (ref 3.1–17.5)
TROPONIN T, HIGH SENSITIVITY RESULT: <6 NG/L — SIGNIFICANT CHANGE UP
VIT B12 SERPL-MCNC: 510 PG/ML — SIGNIFICANT CHANGE UP (ref 200–900)

## 2024-09-25 PROCEDURE — 99232 SBSQ HOSP IP/OBS MODERATE 35: CPT | Mod: GC

## 2024-09-25 PROCEDURE — 99222 1ST HOSP IP/OBS MODERATE 55: CPT

## 2024-09-25 PROCEDURE — 93010 ELECTROCARDIOGRAM REPORT: CPT

## 2024-09-25 RX ORDER — DONEPEZIL HCL 10 MG
10 TABLET ORAL DAILY
Refills: 0 | Status: DISCONTINUED | OUTPATIENT
Start: 2024-09-25 | End: 2024-10-02

## 2024-09-25 RX ORDER — OLANZAPINE 2.5 MG
2.5 TABLET ORAL AT BEDTIME
Refills: 0 | Status: DISCONTINUED | OUTPATIENT
Start: 2024-09-25 | End: 2024-09-27

## 2024-09-25 RX ADMIN — MEMANTINE 10 MILLIGRAM(S): 10 TABLET ORAL at 08:42

## 2024-09-25 RX ADMIN — Medication 10 MILLIGRAM(S): at 08:42

## 2024-09-25 RX ADMIN — QUETIAPINE FUMARATE 50 MILLIGRAM(S): 50 TABLET, FILM COATED ORAL at 20:42

## 2024-09-25 RX ADMIN — Medication 2.5 MILLIGRAM(S): at 20:42

## 2024-09-25 RX ADMIN — Medication 15 MILLIGRAM(S): at 08:42

## 2024-09-25 RX ADMIN — MEMANTINE 10 MILLIGRAM(S): 10 TABLET ORAL at 20:42
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
## 2024-09-25 NOTE — CONSULT NOTE ADULT - ASSESSMENT
Patient is a 73 y/o F with dementia, anxiety/depression, presenting with confusion and agitation. Now admitted to [facility] for further psychiatric stabilization. Medicine consulted for complaint of chest pain.    #Acute Chest Pain  - patient with report of acute onset of chest tightness and shortness of breath after recent stressor  - currently without any symptoms, appearing comfortable  - EKG reviewed, NSR, no ST elevations/depressions, TWI noted in V1 but unchanged from prior  - patient endorsing that these symptoms have been present for several weeks and have always been associated with similar stressors (although patient likely not best historian 2/2 dementia)  - patient also without significant risk factors for cardiovascular disease  - suspect that patient's presentation more likely 2/2 panic attack/anxiety  - can check cardiac enzymes to r/o ACS, although EKG is reassuring and patient now asymptomatic    #Dementia  - c/w memantine 10mg BID  - c/w donepezil 10mg qd    #Depression/Anxiety  - management per primary BH team
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
## 2024-09-25 NOTE — BH INPATIENT PSYCHIATRY PROGRESS NOTE - NSBHFUPINTERVALHXFT_PSY_A_CORE
Patient seen for dementia with depression. Patient slept better. Eating well. Patient wandering into other rooms at times. Patient having conversations with several peers, appears less weepy.

## 2024-09-25 NOTE — CONSULT NOTE ADULT - TIME BILLING
Preparing to see the patient including review of tests and other providers' notes, performing medical examination and evaluation, counseling and educating the patient, ordering medications, tests and procedures, communicating with other health care professionals, documenting clinical information in the EMR, independently interpreting results and communicating results to the patient.

## 2024-09-25 NOTE — BH INPATIENT PSYCHIATRY PROGRESS NOTE - CURRENT MEDICATION
MEDICATIONS  (STANDING):  donepezil 10 milliGRAM(s) Oral daily  escitalopram 15 milliGRAM(s) Oral daily  influenza  Vaccine (HIGH DOSE) 0.5 milliLiter(s) IntraMuscular once  memantine 10 milliGRAM(s) Oral every 12 hours  OLANZapine 2.5 milliGRAM(s) Oral at bedtime  QUEtiapine 50 milliGRAM(s) Oral at bedtime    MEDICATIONS  (PRN):  OLANZapine 5 milliGRAM(s) Oral every 6 hours PRN agitation due to psychosis  OLANZapine Injectable 2.5 milliGRAM(s) IntraMuscular once PRN agitation  QUEtiapine 25 milliGRAM(s) Oral daily PRN Agitation/Insomnia

## 2024-09-26 LAB — T PALLIDUM AB TITR SER: NEGATIVE — SIGNIFICANT CHANGE UP

## 2024-09-26 PROCEDURE — 99232 SBSQ HOSP IP/OBS MODERATE 35: CPT | Mod: GC

## 2024-09-26 PROCEDURE — 90832 PSYTX W PT 30 MINUTES: CPT

## 2024-09-26 RX ORDER — QUETIAPINE FUMARATE 50 MG/1
25 TABLET, FILM COATED ORAL AT BEDTIME
Refills: 0 | Status: DISCONTINUED | OUTPATIENT
Start: 2024-09-26 | End: 2024-09-27

## 2024-09-26 RX ORDER — ESCITALOPRAM OXALATE 10 MG
20 TABLET ORAL DAILY
Refills: 0 | Status: DISCONTINUED | OUTPATIENT
Start: 2024-09-26 | End: 2024-09-27

## 2024-09-26 RX ADMIN — Medication 15 MILLIGRAM(S): at 08:48

## 2024-09-26 RX ADMIN — MEMANTINE 10 MILLIGRAM(S): 10 TABLET ORAL at 08:48

## 2024-09-26 RX ADMIN — MEMANTINE 10 MILLIGRAM(S): 10 TABLET ORAL at 20:31

## 2024-09-26 RX ADMIN — Medication 2.5 MILLIGRAM(S): at 20:31

## 2024-09-26 RX ADMIN — Medication 5 MILLIGRAM(S): at 16:30

## 2024-09-26 RX ADMIN — Medication 10 MILLIGRAM(S): at 08:48

## 2024-09-26 RX ADMIN — QUETIAPINE FUMARATE 25 MILLIGRAM(S): 50 TABLET, FILM COATED ORAL at 20:30

## 2024-09-26 NOTE — BH PSYCHOLOGY - CLINICIAN PSYCHOTHERAPY NOTE - TOKEN PULL-DIAGNOSIS
Primary Diagnosis:  Dementia with behavioral disturbance [F03.918]        Problem Dx:   Acute chest pain [R07.9]      Dementia [F03.90]      Depression, major, single episode, moderate [F32.1]

## 2024-09-26 NOTE — BH INPATIENT PSYCHIATRY PROGRESS NOTE - NSBHCHARTREVIEWVS_PSY_A_CORE FT
Vital Signs Last 24 Hrs  T(C): 36.3 (09-26-24 @ 07:50), Max: 36.3 (09-26-24 @ 07:50)  T(F): 97.4 (09-26-24 @ 07:50), Max: 97.4 (09-26-24 @ 07:50)  HR: --  BP: --  BP(mean): --  RR: --  SpO2: --    Orthostatic VS  09-26-24 @ 07:50  Lying BP: --/-- HR: --  Sitting BP: 119/85 HR: 69  Standing BP: 127/63 HR: 73  Site: upper right arm  Mode: electronic  Orthostatic VS  09-25-24 @ 08:10  Lying BP: --/-- HR: --  Sitting BP: 131/66 HR: 70  Standing BP: 118/61 HR: 88  Site: upper right arm  Mode: electronic

## 2024-09-26 NOTE — BH INPATIENT PSYCHIATRY PROGRESS NOTE - CURRENT MEDICATION
MEDICATIONS  (STANDING):  donepezil 10 milliGRAM(s) Oral daily  escitalopram 15 milliGRAM(s) Oral daily  influenza  Vaccine (HIGH DOSE) 0.5 milliLiter(s) IntraMuscular once  memantine 10 milliGRAM(s) Oral every 12 hours  OLANZapine 2.5 milliGRAM(s) Oral at bedtime  QUEtiapine 50 milliGRAM(s) Oral at bedtime    MEDICATIONS  (PRN):  OLANZapine 5 milliGRAM(s) Oral every 6 hours PRN agitation due to psychosis  OLANZapine Injectable 2.5 milliGRAM(s) IntraMuscular once PRN agitation  QUEtiapine 25 milliGRAM(s) Oral daily PRN Agitation/Insomnia   MEDICATIONS  (STANDING):  donepezil 10 milliGRAM(s) Oral daily  escitalopram 20 milliGRAM(s) Oral daily  influenza  Vaccine (HIGH DOSE) 0.5 milliLiter(s) IntraMuscular once  memantine 10 milliGRAM(s) Oral every 12 hours  OLANZapine 2.5 milliGRAM(s) Oral at bedtime  QUEtiapine 25 milliGRAM(s) Oral at bedtime    MEDICATIONS  (PRN):  OLANZapine 5 milliGRAM(s) Oral every 6 hours PRN agitation due to psychosis  OLANZapine Injectable 2.5 milliGRAM(s) IntraMuscular once PRN agitation  QUEtiapine 25 milliGRAM(s) Oral daily PRN Agitation/Insomnia

## 2024-09-26 NOTE — BH INPATIENT PSYCHIATRY PROGRESS NOTE - NSBHFUPINTERVALHXFT_PSY_A_CORE
75 y/o F seen for dementia with depression. Chart reviewed and discussed with team did not reveal any overnight events. Pt reports she would prefer to sleep better though chart showed she slept well. She is eating better and less weepy as she smiles more. She admits sad moods but denies guilt, worthlessness, or hopelessness, and expresses desire to go back home. She is still not able to give account of her family and only says she has 2 children, a son who she doesn't know where he lives, and a daughter living in [place]. She shared that , who visited yesterday, has been visiting his sick father in the [place]. She is contrasting what she said about  today. She recalls having a chest pain yesterday but denies she was reviewed by any doctor.  She is visible at the common areas and talking to peers amidst psychomotor retardation. She denies SI/HI, AVH and did not express any safety concerns.
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
## 2024-09-26 NOTE — BH INPATIENT PSYCHIATRY PROGRESS NOTE - MSE UNSTRUCTURED FT
The patient appears stated age, appropriately dressed and fairly groomed.  She has  psychomotor retardation. Gait is steady.  Her speech is spontaneous and coherent but irrelevant and tangential sometimes with word finding and confabulation though tone and volume are normal. She admits sad moods and has a Depressed Affect, less constricted. She did not express delusional guilt, worthlessness, or hopelessness and denied any Auditory or Visual Hallucinations. She denies suicidal thoughts and reports she wants to live for "them", though she only mentions her dog, Curt. Her judgement and insight are poor. She is disoriented to place and time, and partially disoriented to person.

## 2024-09-26 NOTE — BH INPATIENT PSYCHIATRY PROGRESS NOTE - NSBHATTESTCOMMENTATTENDFT_PSY_A_CORE
See fellow note for details. I saw and evaluated patient. Iagree with assessment and plan of fellow except will plan to titrate Lexapro to 20mg/d. Will get EKG when meds adjusted to check Qtc See fellow note for details. I saw and evaluated patient. Iagree with assessment and plan of fellow except will plan to titrate Lexapro to 20mg/d and will retaper off Seroquel Will get EKG when meds adjusted to check Qtc

## 2024-09-26 NOTE — BH PSYCHOLOGY - CLINICIAN PSYCHOTHERAPY NOTE - NSBHPSYCHOLNARRATIVE_PSY_A_CORE FT
The patient was seen individually for psychotherapeutic evaluation today at the team's request and with her consent. She accompanied the writer readily to the treatment room made good eye contact. She spoke so softly that she was barely audible. Her comments were often vague or perseverative, for example, continuing to speak about a topic after another had been introduced. She did not demonstrate peculiarities of thinking but did show impoverished thought, limited orientation, and poor and inaccurate memory (e.g., stating that her father-in-law was ill when he had passed away earlier this year). 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    Patient at first stated that she felt like she was “going to die” and was unable to explain why or how. She denied suicidal ideation. She smiled when she spoke about her dog, Elias, and fondly recalled how attached they are (“he knows everything about me, how I’m feeling”). She stated that she gets along with her  and that she enjoys when he is home (although this contradicts what she has been telling staff). She reported a good appetite and good sleep.     Patient did not speak spontaneously and often offered vague or non sequitur responses (e.g., “they all want to help me” without explaining who she was referring to). She had no response when asked what she would like to discuss and her ability to articulate her thoughts and feelings appeared limited. At this time, it does not appear that verbal psychotherapy would be helpful. If her situation changes, psychology will be available for follow-up.

## 2024-09-26 NOTE — BH PSYCHOLOGY - CLINICIAN PSYCHOTHERAPY NOTE - NSTXDCOTHRGOAL_PSY_ALL_CORE
Recommendation of [facility] placement on a memory care unit will be made to pt's . If he is not accepting of this, the pt may be discharged home with resumption of 24x7 aides, safety locks installed, referral back to prior psychiatrist, Dr. Chang, and referral to APS.

## 2024-09-26 NOTE — BH INPATIENT PSYCHIATRY PROGRESS NOTE - NSBHMETABOLIC_PSY_ALL_CORE_FT
BMI:   HbA1c:   Glucose:   BP: --Vital Signs Last 24 Hrs  T(C): 36.3 (09-26-24 @ 07:50), Max: 36.3 (09-26-24 @ 07:50)  T(F): 97.4 (09-26-24 @ 07:50), Max: 97.4 (09-26-24 @ 07:50)  HR: --  BP: --  BP(mean): --  RR: --  SpO2: --    Orthostatic VS  09-26-24 @ 07:50  Lying BP: --/-- HR: --  Sitting BP: 119/85 HR: 69  Standing BP: 127/63 HR: 73  Site: upper right arm  Mode: electronic  Orthostatic VS  09-25-24 @ 08:10  Lying BP: --/-- HR: --  Sitting BP: 131/66 HR: 70  Standing BP: 118/61 HR: 88  Site: upper right arm  Mode: electronic    Lipid Panel:

## 2024-09-27 LAB
ANION GAP SERPL CALC-SCNC: 13 MMOL/L — SIGNIFICANT CHANGE UP (ref 7–14)
BUN SERPL-MCNC: 19 MG/DL — SIGNIFICANT CHANGE UP (ref 7–23)
CALCIUM SERPL-MCNC: 9.1 MG/DL — SIGNIFICANT CHANGE UP (ref 8.4–10.5)
CHLORIDE SERPL-SCNC: 103 MMOL/L — SIGNIFICANT CHANGE UP (ref 98–107)
CO2 SERPL-SCNC: 25 MMOL/L — SIGNIFICANT CHANGE UP (ref 22–31)
CREAT SERPL-MCNC: 0.74 MG/DL — SIGNIFICANT CHANGE UP (ref 0.5–1.3)
EGFR: 85 ML/MIN/1.73M2 — SIGNIFICANT CHANGE UP
GLUCOSE SERPL-MCNC: 118 MG/DL — HIGH (ref 70–99)
HCT VFR BLD CALC: 36.8 % — SIGNIFICANT CHANGE UP (ref 34.5–45)
HGB BLD-MCNC: 11.9 G/DL — SIGNIFICANT CHANGE UP (ref 11.5–15.5)
MCHC RBC-ENTMCNC: 29 PG — SIGNIFICANT CHANGE UP (ref 27–34)
MCHC RBC-ENTMCNC: 32.3 GM/DL — SIGNIFICANT CHANGE UP (ref 32–36)
MCV RBC AUTO: 89.8 FL — SIGNIFICANT CHANGE UP (ref 80–100)
NRBC # BLD: 0 /100 WBCS — SIGNIFICANT CHANGE UP (ref 0–0)
NRBC # FLD: 0 K/UL — SIGNIFICANT CHANGE UP (ref 0–0)
PLATELET # BLD AUTO: 248 K/UL — SIGNIFICANT CHANGE UP (ref 150–400)
POTASSIUM SERPL-MCNC: 4.2 MMOL/L — SIGNIFICANT CHANGE UP (ref 3.5–5.3)
POTASSIUM SERPL-SCNC: 4.2 MMOL/L — SIGNIFICANT CHANGE UP (ref 3.5–5.3)
RBC # BLD: 4.1 M/UL — SIGNIFICANT CHANGE UP (ref 3.8–5.2)
RBC # FLD: 13.8 % — SIGNIFICANT CHANGE UP (ref 10.3–14.5)
SODIUM SERPL-SCNC: 141 MMOL/L — SIGNIFICANT CHANGE UP (ref 135–145)
WBC # BLD: 8.75 K/UL — SIGNIFICANT CHANGE UP (ref 3.8–10.5)
WBC # FLD AUTO: 8.75 K/UL — SIGNIFICANT CHANGE UP (ref 3.8–10.5)

## 2024-09-27 PROCEDURE — 99232 SBSQ HOSP IP/OBS MODERATE 35: CPT | Mod: GC

## 2024-09-27 RX ORDER — OLANZAPINE 2.5 MG
3.75 TABLET ORAL AT BEDTIME
Refills: 0 | Status: DISCONTINUED | OUTPATIENT
Start: 2024-09-27 | End: 2024-09-27

## 2024-09-27 RX ORDER — QUETIAPINE FUMARATE 50 MG/1
25 TABLET, FILM COATED ORAL EVERY 6 HOURS
Refills: 0 | Status: DISCONTINUED | OUTPATIENT
Start: 2024-09-27 | End: 2024-10-02

## 2024-09-27 RX ORDER — ESCITALOPRAM OXALATE 10 MG
15 TABLET ORAL DAILY
Refills: 0 | Status: DISCONTINUED | OUTPATIENT
Start: 2024-09-27 | End: 2024-09-30

## 2024-09-27 RX ORDER — OLANZAPINE 2.5 MG
2.5 TABLET ORAL AT BEDTIME
Refills: 0 | Status: DISCONTINUED | OUTPATIENT
Start: 2024-09-27 | End: 2024-09-30

## 2024-09-27 RX ORDER — OLANZAPINE 2.5 MG
5 TABLET ORAL AT BEDTIME
Refills: 0 | Status: DISCONTINUED | OUTPATIENT
Start: 2024-09-27 | End: 2024-09-27

## 2024-09-27 RX ADMIN — MEMANTINE 10 MILLIGRAM(S): 10 TABLET ORAL at 20:11

## 2024-09-27 RX ADMIN — Medication 2.5 MILLIGRAM(S): at 20:11

## 2024-09-27 RX ADMIN — Medication 5 MILLIGRAM(S): at 11:58

## 2024-09-27 RX ADMIN — Medication 20 MILLIGRAM(S): at 09:44

## 2024-09-27 RX ADMIN — MEMANTINE 10 MILLIGRAM(S): 10 TABLET ORAL at 09:44

## 2024-09-27 RX ADMIN — Medication 10 MILLIGRAM(S): at 09:44
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
## 2024-09-27 NOTE — BH INPATIENT PSYCHIATRY PROGRESS NOTE - PRN MEDS
MEDICATIONS  (PRN):  OLANZapine 5 milliGRAM(s) Oral every 6 hours PRN agitation due to psychosis  OLANZapine Injectable 2.5 milliGRAM(s) IntraMuscular once PRN agitation  QUEtiapine 25 milliGRAM(s) Oral daily PRN Agitation/Insomnia   MEDICATIONS  (PRN):  OLANZapine Injectable 2.5 milliGRAM(s) IntraMuscular once PRN agitation  QUEtiapine 25 milliGRAM(s) Oral every 6 hours PRN Agitation

## 2024-09-27 NOTE — BH INPATIENT PSYCHIATRY PROGRESS NOTE - NSBHATTESTCOMMENTATTENDFT_PSY_A_CORE
See fellow note for details. I saw and evaluated patient. I agree with assessment and plan of fellow except for following. Patient today had episode of letting out a shriek. Patient then sat down and when asked what happened she said lady in chair next to me wont let me have lunch. When pointed out there was no one in chair she said there was some there few minutes ago but agrees there is no one there now. Patient denies paranoia or seeing or hearing other things others don't hear. Some possibility patient agrees to symptoms  just by being asked about them. It was confirmed patient has not had similar symptoms at home. Unclear if she shrieked in response to transient vh . Unclear cause of event, no obvious  medical causes based on vital, labs, will get ua though was not remarkable on admission. Cannot rule out some mental status change from switch to olanzapine but low suspicion . Will cont to titrate olanzapine slowly for now. If there is any evidence of worsening could be from anti cholinergic effects of olanzapine in which case would dc in favor of low anticholinergic med such as risperdal or Rexulti. Another option would be to go back to Seroquel but use at higher dose. Family and care takers aware of events.  See fellow note for details. I saw and evaluated patient. I agree with assessment and plan of fellow except for following. Patient today had episode of letting out a shriek. Patient then sat down and when asked what happened she said lady in chair next to me wont let me have lunch. When pointed out there was no one in chair she said there was some there few minutes ago but agrees there is no one there now. Patient denies paranoia or seeing or hearing other things others don't hear. Some possibility patient agrees to symptoms  just by being asked about them. It was confirmed patient has not had similar symptoms at home. Unclear if she shrieked in response to transient vh . Unclear cause of event, no obvious  medical causes based on vital, labs, will get ua though was not remarkable on admission. Cannot rule out some mental status change from switch to olanzapine but low suspicion . Will cont to olanzapine at current dose for now If there is any evidence of worsening could be from anti cholinergic effects of olanzapine in which case would dc in favor of low anticholinergic med such as risperdal or Rexulti.  Family and care takers aware of events.

## 2024-09-27 NOTE — BH INPATIENT PSYCHIATRY PROGRESS NOTE - NSBHMETABOLIC_PSY_ALL_CORE_FT
BMI:   HbA1c:   Glucose:   BP: --Vital Signs Last 24 Hrs  T(C): 36.7 (09-27-24 @ 07:46), Max: 36.7 (09-27-24 @ 07:46)  T(F): 98 (09-27-24 @ 07:46), Max: 98 (09-27-24 @ 07:46)  HR: --  BP: --  BP(mean): --  RR: --  SpO2: --    Orthostatic VS  09-27-24 @ 07:46  Lying BP: --/-- HR: --  Sitting BP: 114/87 HR: 64  Standing BP: 117/67 HR: 69  Site: --  Mode: --  Orthostatic VS  09-26-24 @ 07:50  Lying BP: --/-- HR: --  Sitting BP: 119/85 HR: 69  Standing BP: 127/63 HR: 73  Site: upper right arm  Mode: electronic    Lipid Panel:

## 2024-09-27 NOTE — BH INPATIENT PSYCHIATRY PROGRESS NOTE - NSBHCHARTREVIEWVS_PSY_A_CORE FT
Vital Signs Last 24 Hrs  T(C): 36.7 (09-27-24 @ 07:46), Max: 36.7 (09-27-24 @ 07:46)  T(F): 98 (09-27-24 @ 07:46), Max: 98 (09-27-24 @ 07:46)  HR: --  BP: --  BP(mean): --  RR: --  SpO2: --    Orthostatic VS  09-27-24 @ 07:46  Lying BP: --/-- HR: --  Sitting BP: 114/87 HR: 64  Standing BP: 117/67 HR: 69  Site: --  Mode: --  Orthostatic VS  09-26-24 @ 07:50  Lying BP: --/-- HR: --  Sitting BP: 119/85 HR: 69  Standing BP: 127/63 HR: 73  Site: upper right arm  Mode: electronic

## 2024-09-27 NOTE — BH INPATIENT PSYCHIATRY PROGRESS NOTE - MSE UNSTRUCTURED FT
The patient appears stated age, appropriately dressed and fairly groomed.  She has  psychomotor retardation. Gait is steady.  Her speech is spontaneous and coherent but irrelevant and tangential sometimes with word finding, confabulation,  and perseveration in a low tone. She admits sad moods and has a Depressed Congruent Affect. She expressed delusional guilt and a very low self-esteem. She denied any Auditory or Visual Hallucinations. She did not respond to current suicidal thoughts though she admits that was what brought her to admission and the conditions have not changed. Her judgement and insight are poor. She is disoriented to place and time, and partially disoriented to person by recalling that she is an old woman but not sure of her age.

## 2024-09-27 NOTE — BH INPATIENT PSYCHIATRY PROGRESS NOTE - CURRENT MEDICATION
MEDICATIONS  (STANDING):  donepezil 10 milliGRAM(s) Oral daily  escitalopram 20 milliGRAM(s) Oral daily  influenza  Vaccine (HIGH DOSE) 0.5 milliLiter(s) IntraMuscular once  memantine 10 milliGRAM(s) Oral every 12 hours  OLANZapine 2.5 milliGRAM(s) Oral at bedtime  QUEtiapine 25 milliGRAM(s) Oral at bedtime    MEDICATIONS  (PRN):  OLANZapine 5 milliGRAM(s) Oral every 6 hours PRN agitation due to psychosis  OLANZapine Injectable 2.5 milliGRAM(s) IntraMuscular once PRN agitation  QUEtiapine 25 milliGRAM(s) Oral daily PRN Agitation/Insomnia   MEDICATIONS  (STANDING):  donepezil 10 milliGRAM(s) Oral daily  escitalopram 15 milliGRAM(s) Oral daily  influenza  Vaccine (HIGH DOSE) 0.5 milliLiter(s) IntraMuscular once  memantine 10 milliGRAM(s) Oral every 12 hours  OLANZapine 5 milliGRAM(s) Oral at bedtime    MEDICATIONS  (PRN):  OLANZapine Injectable 2.5 milliGRAM(s) IntraMuscular once PRN agitation  QUEtiapine 25 milliGRAM(s) Oral every 6 hours PRN Agitation   MEDICATIONS  (STANDING):  donepezil 10 milliGRAM(s) Oral daily  escitalopram 15 milliGRAM(s) Oral daily  influenza  Vaccine (HIGH DOSE) 0.5 milliLiter(s) IntraMuscular once  memantine 10 milliGRAM(s) Oral every 12 hours  OLANZapine 2.5 milliGRAM(s) Oral at bedtime    MEDICATIONS  (PRN):  OLANZapine Injectable 2.5 milliGRAM(s) IntraMuscular once PRN agitation  QUEtiapine 25 milliGRAM(s) Oral every 6 hours PRN Agitation

## 2024-09-28 PROCEDURE — 99232 SBSQ HOSP IP/OBS MODERATE 35: CPT

## 2024-09-28 RX ADMIN — MEMANTINE 10 MILLIGRAM(S): 10 TABLET ORAL at 21:00

## 2024-09-28 RX ADMIN — Medication 10 MILLIGRAM(S): at 08:56

## 2024-09-28 RX ADMIN — Medication 15 MILLIGRAM(S): at 08:56

## 2024-09-28 RX ADMIN — MEMANTINE 10 MILLIGRAM(S): 10 TABLET ORAL at 08:56

## 2024-09-28 RX ADMIN — Medication 2.5 MILLIGRAM(S): at 21:00

## 2024-09-28 RX ADMIN — QUETIAPINE FUMARATE 25 MILLIGRAM(S): 50 TABLET, FILM COATED ORAL at 13:41

## 2024-09-28 NOTE — BH INPATIENT PSYCHIATRY PROGRESS NOTE - NSBHMETABOLIC_PSY_ALL_CORE_FT
BMI:   HbA1c:   Glucose:   BP: --Vital Signs Last 24 Hrs  T(C): 36.8 (09-28-24 @ 08:00), Max: 36.8 (09-28-24 @ 08:00)  T(F): 98.3 (09-28-24 @ 08:00), Max: 98.3 (09-28-24 @ 08:00)  HR: --  BP: --  BP(mean): --  RR: --  SpO2: --    Orthostatic VS  09-28-24 @ 08:00  Lying BP: --/-- HR: --  Sitting BP: 118/67 HR: 67  Standing BP: 113/60 HR: 77  Site: --  Mode: --  Orthostatic VS  09-27-24 @ 07:46  Lying BP: --/-- HR: --  Sitting BP: 114/87 HR: 64  Standing BP: 117/67 HR: 69  Site: --  Mode: --    Lipid Panel:
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
## 2024-09-28 NOTE — BH INPATIENT PSYCHIATRY PROGRESS NOTE - CURRENT MEDICATION
MEDICATIONS  (STANDING):  donepezil 10 milliGRAM(s) Oral daily  escitalopram 15 milliGRAM(s) Oral daily  influenza  Vaccine (HIGH DOSE) 0.5 milliLiter(s) IntraMuscular once  memantine 10 milliGRAM(s) Oral every 12 hours  OLANZapine 2.5 milliGRAM(s) Oral at bedtime    MEDICATIONS  (PRN):  OLANZapine Injectable 2.5 milliGRAM(s) IntraMuscular once PRN agitation  QUEtiapine 25 milliGRAM(s) Oral every 6 hours PRN Agitation

## 2024-09-28 NOTE — BH INPATIENT PSYCHIATRY PROGRESS NOTE - PRN MEDS
MEDICATIONS  (PRN):  OLANZapine Injectable 2.5 milliGRAM(s) IntraMuscular once PRN agitation  QUEtiapine 25 milliGRAM(s) Oral every 6 hours PRN Agitation

## 2024-09-28 NOTE — BH INPATIENT PSYCHIATRY PROGRESS NOTE - NSBHCHARTREVIEWVS_PSY_A_CORE FT
Vital Signs Last 24 Hrs  T(C): 36.8 (09-28-24 @ 08:00), Max: 36.8 (09-28-24 @ 08:00)  T(F): 98.3 (09-28-24 @ 08:00), Max: 98.3 (09-28-24 @ 08:00)  HR: --  BP: --  BP(mean): --  RR: --  SpO2: --    Orthostatic VS  09-28-24 @ 08:00  Lying BP: --/-- HR: --  Sitting BP: 118/67 HR: 67  Standing BP: 113/60 HR: 77  Site: --  Mode: --  Orthostatic VS  09-27-24 @ 07:46  Lying BP: --/-- HR: --  Sitting BP: 114/87 HR: 64  Standing BP: 117/67 HR: 69  Site: --  Mode: --

## 2024-09-28 NOTE — BH INPATIENT PSYCHIATRY PROGRESS NOTE - MSE UNSTRUCTURED FT
The patient appears stated age, appropriately dressed and fairly groomed.  Minimal psychomotor retardation. Gait is steady.  Her speech is spontaneous and coherent, soft spoken. Unable to assess mood but affect appears mildly depressed. Denied any Auditory or Visual Hallucinations. No spontaneous statements about suicidal thoughts. Her judgement and insight are poor. She is completely disoriented.

## 2024-09-28 NOTE — BH INPATIENT PSYCHIATRY PROGRESS NOTE - NSBHFUPINTERVALHXFT_PSY_A_CORE
Patient remains confused, unable to provide relevant responses to questions. Continues to receive PRNs for anxiety. Denies urinary symptoms.

## 2024-09-29 LAB
APPEARANCE UR: CLEAR — SIGNIFICANT CHANGE UP
BILIRUB UR-MCNC: NEGATIVE — SIGNIFICANT CHANGE UP
COLOR SPEC: YELLOW — SIGNIFICANT CHANGE UP
DIFF PNL FLD: NEGATIVE — SIGNIFICANT CHANGE UP
GLUCOSE UR QL: NEGATIVE MG/DL — SIGNIFICANT CHANGE UP
KETONES UR-MCNC: NEGATIVE MG/DL — SIGNIFICANT CHANGE UP
LEUKOCYTE ESTERASE UR-ACNC: NEGATIVE — SIGNIFICANT CHANGE UP
NITRITE UR-MCNC: NEGATIVE — SIGNIFICANT CHANGE UP
PH UR: 7 — SIGNIFICANT CHANGE UP (ref 5–8)
PROT UR-MCNC: NEGATIVE MG/DL — SIGNIFICANT CHANGE UP
SP GR SPEC: 1.02 — SIGNIFICANT CHANGE UP (ref 1–1.03)
UROBILINOGEN FLD QL: 0.2 MG/DL — SIGNIFICANT CHANGE UP (ref 0.2–1)

## 2024-09-29 PROCEDURE — 99232 SBSQ HOSP IP/OBS MODERATE 35: CPT

## 2024-09-29 RX ADMIN — MEMANTINE 10 MILLIGRAM(S): 10 TABLET ORAL at 09:12

## 2024-09-29 RX ADMIN — Medication 15 MILLIGRAM(S): at 09:12

## 2024-09-29 RX ADMIN — MEMANTINE 10 MILLIGRAM(S): 10 TABLET ORAL at 21:08

## 2024-09-29 RX ADMIN — Medication 10 MILLIGRAM(S): at 09:12

## 2024-09-29 RX ADMIN — Medication 2.5 MILLIGRAM(S): at 21:07

## 2024-09-29 NOTE — BH INPATIENT PSYCHIATRY PROGRESS NOTE - NSBHCHARTREVIEWVS_PSY_A_CORE FT
Vital Signs Last 24 Hrs  T(C): 36.8 (09-29-24 @ 07:54), Max: 36.8 (09-29-24 @ 07:54)  T(F): 98.3 (09-29-24 @ 07:54), Max: 98.3 (09-29-24 @ 07:54)  HR: --  BP: --  BP(mean): --  RR: --  SpO2: --    Orthostatic VS  09-29-24 @ 07:54  Lying BP: --/-- HR: --  Sitting BP: 112/67 HR: 64  Standing BP: 105/59 HR: 74  Site: --  Mode: --  Orthostatic VS  09-28-24 @ 08:00  Lying BP: --/-- HR: --  Sitting BP: 118/67 HR: 67  Standing BP: 113/60 HR: 77  Site: --  Mode: --

## 2024-09-29 NOTE — BH INPATIENT PSYCHIATRY PROGRESS NOTE - MSE UNSTRUCTURED FT
The patient appears stated age, appropriately dressed and fairly groomed.  Minimal psychomotor retardation. Gait is steady.  Her speech is spontaneous and coherent, soft spoken. Mood anxious, congruent with affect but low intensity, stable, constricted. Denied any Auditory or Visual Hallucinations. No spontaneous statements about suicidal thoughts. No delusions elicited.  Her judgement and insight are poor. She is completely disoriented.

## 2024-09-29 NOTE — BH INPATIENT PSYCHIATRY PROGRESS NOTE - NSBHMETABOLIC_PSY_ALL_CORE_FT
BMI:   HbA1c:   Glucose:   BP: --Vital Signs Last 24 Hrs  T(C): 36.8 (09-29-24 @ 07:54), Max: 36.8 (09-29-24 @ 07:54)  T(F): 98.3 (09-29-24 @ 07:54), Max: 98.3 (09-29-24 @ 07:54)  HR: --  BP: --  BP(mean): --  RR: --  SpO2: --    Orthostatic VS  09-29-24 @ 07:54  Lying BP: --/-- HR: --  Sitting BP: 112/67 HR: 64  Standing BP: 105/59 HR: 74  Site: --  Mode: --  Orthostatic VS  09-28-24 @ 08:00  Lying BP: --/-- HR: --  Sitting BP: 118/67 HR: 67  Standing BP: 113/60 HR: 77  Site: --  Mode: --    Lipid Panel:
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
## 2024-09-29 NOTE — BH INPATIENT PSYCHIATRY PROGRESS NOTE - NSBHFUPINTERVALHXFT_PSY_A_CORE
Patient remains confused, disorganized, unable to have coherent conversation. Talks about her dog. No acute needs overnight. Able to eat on her own.

## 2024-09-30 LAB
CULTURE RESULTS: SIGNIFICANT CHANGE UP
SPECIMEN SOURCE: SIGNIFICANT CHANGE UP

## 2024-09-30 PROCEDURE — 99232 SBSQ HOSP IP/OBS MODERATE 35: CPT | Mod: GC

## 2024-09-30 RX ORDER — ESCITALOPRAM OXALATE 10 MG
15 TABLET ORAL DAILY
Refills: 0 | Status: DISCONTINUED | OUTPATIENT
Start: 2024-09-30 | End: 2024-10-02

## 2024-09-30 RX ORDER — OLANZAPINE 2.5 MG
3.75 TABLET ORAL AT BEDTIME
Refills: 0 | Status: DISCONTINUED | OUTPATIENT
Start: 2024-09-30 | End: 2024-10-02

## 2024-09-30 RX ADMIN — MEMANTINE 10 MILLIGRAM(S): 10 TABLET ORAL at 20:47

## 2024-09-30 RX ADMIN — MEMANTINE 10 MILLIGRAM(S): 10 TABLET ORAL at 08:15

## 2024-09-30 RX ADMIN — Medication 10 MILLIGRAM(S): at 08:14

## 2024-09-30 RX ADMIN — Medication 3.75 MILLIGRAM(S): at 20:47

## 2024-09-30 RX ADMIN — Medication 15 MILLIGRAM(S): at 08:14

## 2024-09-30 NOTE — BH INPATIENT PSYCHIATRY PROGRESS NOTE - NSBHCHARTREVIEWVS_PSY_A_CORE FT
Vital Signs Last 24 Hrs  T(C): 36.7 (09-30-24 @ 07:43), Max: 36.7 (09-30-24 @ 07:43)  T(F): 98.1 (09-30-24 @ 07:43), Max: 98.1 (09-30-24 @ 07:43)  HR: --  BP: --  BP(mean): --  RR: --  SpO2: --    Orthostatic VS  09-30-24 @ 07:43  Lying BP: --/-- HR: --  Sitting BP: 123/97 HR: 64  Standing BP: --/-- HR: --  Site: --  Mode: --  Orthostatic VS  09-29-24 @ 07:54  Lying BP: --/-- HR: --  Sitting BP: 112/67 HR: 64  Standing BP: 105/59 HR: 74  Site: --  Mode: --

## 2024-09-30 NOTE — BH INPATIENT PSYCHIATRY PROGRESS NOTE - NSBHFUPINTERVALHXFT_PSY_A_CORE
75 y/o F seen for Depressive Episode in the context of Declining Neurocognitive function in Early Onset Alzheimer's Dementia. Chart reviewed and discussed with team did not reveal any overnight/morning events. Pt continues to report sad moods associated with her "friends" (HHAs) and dog not being around. She admits her parents are dead but sometimes refers to HHAs as parents. Though she does not recall her birthday or age, she shared she had 2 children, a son and a daughter, but did not know where they are, or what they do. Of note today was no mention of  throughout assessment and not being weepy or angry. She reports normal bowel movement, dry mouth, and difficulty falling asleep, but denies any urinary symptoms or fall tendencies. She is producing longer sentences now though often irrelevant and admits feeling confused. She did not report SI/HI/Safety concerns in the unit.

## 2024-09-30 NOTE — BH INPATIENT PSYCHIATRY PROGRESS NOTE - NSBHMETABOLIC_PSY_ALL_CORE_FT
BMI:   HbA1c:   Glucose:   BP: --Vital Signs Last 24 Hrs  T(C): 36.7 (09-30-24 @ 07:43), Max: 36.7 (09-30-24 @ 07:43)  T(F): 98.1 (09-30-24 @ 07:43), Max: 98.1 (09-30-24 @ 07:43)  HR: --  BP: --  BP(mean): --  RR: --  SpO2: --    Orthostatic VS  09-30-24 @ 07:43  Lying BP: --/-- HR: --  Sitting BP: 123/97 HR: 64  Standing BP: --/-- HR: --  Site: --  Mode: --  Orthostatic VS  09-29-24 @ 07:54  Lying BP: --/-- HR: --  Sitting BP: 112/67 HR: 64  Standing BP: 105/59 HR: 74  Site: --  Mode: --    Lipid Panel:
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
## 2024-09-30 NOTE — BH INPATIENT PSYCHIATRY PROGRESS NOTE - NSBHATTESTCOMMENTATTENDFT_PSY_A_CORE
Writer covering for Dr. Daniel. Ms. Dorsey was recently started on Zyprexa 2.5mg. Last Friday pt shrieked and reported possible visual hallucinations of a woman sitting in a chair telling her she couldn't eat. Labs were WNL. Will titrate Zyprexa to 3.75mg QHS and monitor response. If pt remains stable, will likely be discharged home with home care on Thursday.

## 2024-09-30 NOTE — BH INPATIENT PSYCHIATRY PROGRESS NOTE - MSE UNSTRUCTURED FT
The patient appears stated age, appropriately dressed and fairly groomed.  Minimal psychomotor retardation. Gait is steady.  Her speech is spontaneous and coherent though often irrelevant, soft spoken. Mood anxious and sad, congruent with affect but low intensity, stable, constricted. Denied any Auditory or Visual Hallucinations. No spontaneous statements about suicidal thoughts. No delusions elicited.  Her judgement and insight are poor. She is completely disoriented.

## 2024-09-30 NOTE — BH INPATIENT PSYCHIATRY PROGRESS NOTE - CURRENT MEDICATION
MEDICATIONS  (STANDING):  donepezil 10 milliGRAM(s) Oral daily  escitalopram 15 milliGRAM(s) Oral daily  influenza  Vaccine (HIGH DOSE) 0.5 milliLiter(s) IntraMuscular once  memantine 10 milliGRAM(s) Oral every 12 hours  OLANZapine 2.5 milliGRAM(s) Oral at bedtime    MEDICATIONS  (PRN):  OLANZapine Injectable 2.5 milliGRAM(s) IntraMuscular once PRN agitation  QUEtiapine 25 milliGRAM(s) Oral every 6 hours PRN Agitation   MEDICATIONS  (STANDING):  donepezil 10 milliGRAM(s) Oral daily  escitalopram 15 milliGRAM(s) Oral daily  influenza  Vaccine (HIGH DOSE) 0.5 milliLiter(s) IntraMuscular once  memantine 10 milliGRAM(s) Oral every 12 hours  OLANZapine 3.75 milliGRAM(s) Oral at bedtime    MEDICATIONS  (PRN):  OLANZapine Injectable 2.5 milliGRAM(s) IntraMuscular once PRN agitation  QUEtiapine 25 milliGRAM(s) Oral every 6 hours PRN Agitation

## 2024-10-01 DIAGNOSIS — G30.9 ALZHEIMER'S DISEASE, UNSPECIFIED: ICD-10-CM

## 2024-10-01 PROCEDURE — 99232 SBSQ HOSP IP/OBS MODERATE 35: CPT | Mod: GC

## 2024-10-01 RX ORDER — DONEPEZIL HCL 10 MG
1 TABLET ORAL
Qty: 30 | Refills: 0
Start: 2024-10-01 | End: 2024-10-30

## 2024-10-01 RX ORDER — MEMANTINE 10 MG/1
1 TABLET ORAL
Qty: 60 | Refills: 0
Start: 2024-10-01 | End: 2024-10-30

## 2024-10-01 RX ORDER — ESCITALOPRAM OXALATE 10 MG
3 TABLET ORAL
Qty: 0 | Refills: 0 | DISCHARGE
Start: 2024-10-01

## 2024-10-01 RX ORDER — ESCITALOPRAM OXALATE 10 MG
3 TABLET ORAL
Qty: 90 | Refills: 0
Start: 2024-10-01 | End: 2024-10-30

## 2024-10-01 RX ADMIN — Medication 3.75 MILLIGRAM(S): at 20:41

## 2024-10-01 RX ADMIN — MEMANTINE 10 MILLIGRAM(S): 10 TABLET ORAL at 20:47

## 2024-10-01 RX ADMIN — QUETIAPINE FUMARATE 25 MILLIGRAM(S): 50 TABLET, FILM COATED ORAL at 11:53

## 2024-10-01 RX ADMIN — Medication 17 GRAM(S): at 18:05

## 2024-10-01 RX ADMIN — Medication 15 MILLIGRAM(S): at 09:40

## 2024-10-01 RX ADMIN — MEMANTINE 10 MILLIGRAM(S): 10 TABLET ORAL at 09:40

## 2024-10-01 RX ADMIN — Medication 10 MILLIGRAM(S): at 09:40
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
## 2024-10-01 NOTE — BH DISCHARGE NOTE NURSING/SOCIAL WORK/PSYCH REHAB - NSDCPRGOAL_PSY_ALL_CORE
Patient initially presented with symptoms of confusion, agitation, restlessness and depression. Patient's initial goals included increasing patient's frustration tolerance, decreasing agitated and restless behavior. Patient made gains towards her rehab goals as evidenced by patient's brighter affect, decrease agitation and decreased restlessness. Patient also demonstrated daily medication compliance. Patient with moderate encouragement attended some of the rehab groups. Patient responds well to supportive encouragement and structure.    ***A safety plan was completed.

## 2024-10-01 NOTE — BH INPATIENT PSYCHIATRY PROGRESS NOTE - MSE UNSTRUCTURED FT
The patient appears stated age, appropriately dressed and fairly groomed.  Minimal psychomotor retardation. Gait is steady.  Her speech is coherent, hesitant sometimes, often irrelevant. Mood is sad, congruent with affect but low intensity, stable, constricted. Denied any Auditory or Visual Hallucinations. No spontaneous statements about suicidal thoughts. No delusions elicited.  Her judgement and insight are poor. She is completely disoriented.

## 2024-10-01 NOTE — BH DISCHARGE NOTE NURSING/SOCIAL WORK/PSYCH REHAB - NSDCPRRECOMMEND_PSY_ALL_CORE
Patient is scheduled to be discharged today and will return to her previous residence. Patient responds well to structured activities centered around music, and exercise.

## 2024-10-01 NOTE — BH INPATIENT PSYCHIATRY DISCHARGE NOTE - DESCRIPTION
lives in [place] with 24 hr HHA and poodle.  travels overseas 3 weeks of every month, estranged Poor relationship with 2 children. Former teacher.

## 2024-10-01 NOTE — BH INPATIENT PSYCHIATRY DISCHARGE NOTE - MSE UNSTRUCTURED FT
Elderly lady, looking her stated age, appropriately dressed and kempt, with no abnormal motor activity. She was cooperative with assessment. Her speech was coherent but often irrelevant. She reports Anxious Mood and had a Congruent Affect. She did not express any delusions and denies AVH, SI, HI. She is minimally oriented to person but disoriented to time and place. Her judgement and Insight are poor.
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
## 2024-10-01 NOTE — BH INPATIENT PSYCHIATRY PROGRESS NOTE - NSBHCHARTREVIEWVS_PSY_A_CORE FT
Vital Signs Last 24 Hrs  T(C): 36.4 (10-01-24 @ 05:51), Max: 36.4 (10-01-24 @ 05:51)  T(F): 97.5 (10-01-24 @ 05:51), Max: 97.5 (10-01-24 @ 05:51)  HR: --  BP: --  BP(mean): --  RR: 18 (10-01-24 @ 05:51) (18 - 18)  SpO2: --    Orthostatic VS  10-01-24 @ 05:51  Lying BP: --/-- HR: --  Sitting BP: 141/78 HR: 67  Standing BP: 124/67 HR: 71  Site: upper right arm  Mode: electronic  Orthostatic VS  09-30-24 @ 07:43  Lying BP: --/-- HR: --  Sitting BP: 123/97 HR: 64  Standing BP: --/-- HR: --  Site: --  Mode: --

## 2024-10-01 NOTE — BH INPATIENT PSYCHIATRY DISCHARGE NOTE - NSDCMRMEDTOKEN_GEN_ALL_CORE_FT
donepezil 10 mg oral tablet: 1 tab(s) orally once a day  escitalopram 5 mg oral tablet: 3 tab(s) orally once a day  memantine 10 mg oral tablet: 1 tab(s) orally every 12 hours  OLANZapine 2.5 mg oral tablet: 3.75 milligram(s) orally once a day (at bedtime)   donepezil 10 mg oral tablet: 1 tab(s) orally once a day  escitalopram 5 mg oral tablet: 3 tab(s) orally once a day  memantine 10 mg oral tablet: 1 tab(s) orally every 12 hours  OLANZapine 2.5 mg oral tablet: 3.75 milligram(s) orally once a day (at bedtime)  polyethylene glycol 3350 oral powder for reconstitution: 17 gram(s) orally once a day (at bedtime) As needed Constipation
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
## 2024-10-01 NOTE — BH DISCHARGE NOTE NURSING/SOCIAL WORK/PSYCH REHAB - PATIENT PORTAL LINK FT
You can access the FollowMyHealth Patient Portal offered by [facility] by registering at the following website: [email or web address]. By joining [employer] [facility] FollowMyHealth portal, you will also be able to view your health information using other applications (apps) compatible with our system.

## 2024-10-01 NOTE — BH DISCHARGE NOTE NURSING/SOCIAL WORK/PSYCH REHAB - NSCDUDCCRISIS_PSY_A_CORE
.  [facility]  1 [phone] [phone])/.  [facility]  [phone]/.  [facility]al Health Helpline / [facility]  [phone].  [facility]  [phone] [place] [phone]   Hours:  Monday through Friday from 9 AM to 3 PM/[facility]

## 2024-10-01 NOTE — BH INPATIENT PSYCHIATRY PROGRESS NOTE - CURRENT MEDICATION
MEDICATIONS  (STANDING):  donepezil 10 milliGRAM(s) Oral daily  escitalopram 15 milliGRAM(s) Oral daily  influenza  Vaccine (HIGH DOSE) 0.5 milliLiter(s) IntraMuscular once  memantine 10 milliGRAM(s) Oral every 12 hours  OLANZapine 3.75 milliGRAM(s) Oral at bedtime    MEDICATIONS  (PRN):  OLANZapine Injectable 2.5 milliGRAM(s) IntraMuscular once PRN agitation  QUEtiapine 25 milliGRAM(s) Oral every 6 hours PRN Agitation   MEDICATIONS  (STANDING):  donepezil 10 milliGRAM(s) Oral daily  escitalopram 15 milliGRAM(s) Oral daily  influenza  Vaccine (HIGH DOSE) 0.5 milliLiter(s) IntraMuscular once  memantine 10 milliGRAM(s) Oral every 12 hours  OLANZapine 3.75 milliGRAM(s) Oral at bedtime    MEDICATIONS  (PRN):  OLANZapine Injectable 2.5 milliGRAM(s) IntraMuscular once PRN agitation  polyethylene glycol 3350 17 Gram(s) Oral at bedtime PRN Constipation  QUEtiapine 25 milliGRAM(s) Oral every 6 hours PRN Agitation

## 2024-10-01 NOTE — BH INPATIENT PSYCHIATRY DISCHARGE NOTE - NSBHFUPINTERVALHXFT_PSY_A_CORE
75 y/o F seen for Depressive Episode in the context of Declining Neurocognitive function in Early Onset Alzheimer's Dementia. Chart reviewed and discussed with team did not reveal any overnight/morning events. Pt reports being afraid, pointing towards and agitated patient in the unit. Chart reveals she slept for 7.5 hrs and she had her breakfast feeding herself. She was observed sitting with a peer at the Hallway having a conversation. She is still disoriented to place and time. She reports normal BM, good appetite, taking her oral meds, and denies AVH/SI.HI.

## 2024-10-01 NOTE — BH INPATIENT PSYCHIATRY PROGRESS NOTE - NSBHFUPINTERVALHXFT_PSY_A_CORE
73 y/o F seen for Depressive Episode in the context of Declining Neurocognitive function in Early Onset Alzheimer's Dementia. Chart reviewed and discussed with team did not reveal any overnight/morning events. Pt reports poor sleep but chart showed she had at least 7 hours of sleep. She is not observed to be weepy any longer and her psychomotor retardation is minimized. She endorses sad moods and memory challenges, and not talking about her dog's safety. Today, she identifies herself to be similar age to other patients and believes they are all grannies but was not able to state her age. She reports she had children and grandchildren but could not recall any names. She did not report SI/HI/Safety concerns in the unit. She reports normal BM, good appetite, and taking her oral meds.

## 2024-10-01 NOTE — BH DISCHARGE NOTE NURSING/SOCIAL WORK/PSYCH REHAB - NSDCVIVACCINE_GEN_ALL_CORE_FT
Tdap; 10-Mar-2020 18:20; Cheyenne Galo (RN); [employer]; [id number] (Exp. Date: 15-Aug-2021); IntraMuscular; Deltoid Left.; 0.5 milliLiter(s); VIS (VIS Published: 09-May-2013, VIS Presented: 10-Mar-2020);

## 2024-10-01 NOTE — BH INPATIENT PSYCHIATRY DISCHARGE NOTE - NSDCCPCAREPLAN_GEN_ALL_CORE_FT
PRINCIPAL DISCHARGE DIAGNOSIS  Diagnosis: Single major depressive episode, severe, with psychosis  Assessment and Plan of Treatment:       SECONDARY DISCHARGE DIAGNOSES  Diagnosis: Alzheimer's dementia with behavioral disturbance  Assessment and Plan of Treatment:

## 2024-10-01 NOTE — BH INPATIENT PSYCHIATRY PROGRESS NOTE - NSBHATTESTCOMMENTATTENDFT_PSY_A_CORE
Pt is seen and evaluated, agree with assessment and plan as stated.  Care is personally coordinated with outpatient psychiatrist Dr. Chang with follow up appointment in place for 10am on 10/7.  Also d/w  of pt, with discharge options explored.
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
## 2024-10-01 NOTE — BH DISCHARGE NOTE NURSING/SOCIAL WORK/PSYCH REHAB - DISCHARGE INSTRUCTIONS AFTERCARE APPOINTMENTS
In order to check the location, date, or time of your aftercare appointment, please refer to your Discharge Instructions Document given to you upon leaving the hospital.  If you have lost the instructions please call [phone]

## 2024-10-01 NOTE — BH INPATIENT PSYCHIATRY PROGRESS NOTE - PRN MEDS
MEDICATIONS  (PRN):  OLANZapine Injectable 2.5 milliGRAM(s) IntraMuscular once PRN agitation  QUEtiapine 25 milliGRAM(s) Oral every 6 hours PRN Agitation   MEDICATIONS  (PRN):  OLANZapine Injectable 2.5 milliGRAM(s) IntraMuscular once PRN agitation  polyethylene glycol 3350 17 Gram(s) Oral at bedtime PRN Constipation  QUEtiapine 25 milliGRAM(s) Oral every 6 hours PRN Agitation
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
## 2024-10-01 NOTE — BH INPATIENT PSYCHIATRY PROGRESS NOTE - NSBHMETABOLIC_PSY_ALL_CORE_FT
BMI:   HbA1c:   Glucose:   BP: --Vital Signs Last 24 Hrs  T(C): 36.4 (10-01-24 @ 05:51), Max: 36.4 (10-01-24 @ 05:51)  T(F): 97.5 (10-01-24 @ 05:51), Max: 97.5 (10-01-24 @ 05:51)  HR: --  BP: --  BP(mean): --  RR: 18 (10-01-24 @ 05:51) (18 - 18)  SpO2: --    Orthostatic VS  10-01-24 @ 05:51  Lying BP: --/-- HR: --  Sitting BP: 141/78 HR: 67  Standing BP: 124/67 HR: 71  Site: upper right arm  Mode: electronic  Orthostatic VS  09-30-24 @ 07:43  Lying BP: --/-- HR: --  Sitting BP: 123/97 HR: 64  Standing BP: --/-- HR: --  Site: --  Mode: --    Lipid Panel:

## 2024-10-02 VITALS — WEIGHT: 149.91 LBS

## 2024-10-02 PROCEDURE — 99232 SBSQ HOSP IP/OBS MODERATE 35: CPT | Mod: GC

## 2024-10-02 RX ORDER — MEMANTINE 10 MG/1
1 TABLET ORAL
Qty: 60 | Refills: 0
Start: 2024-10-02 | End: 2024-10-31

## 2024-10-02 RX ORDER — ESCITALOPRAM OXALATE 10 MG
3 TABLET ORAL
Qty: 90 | Refills: 0
Start: 2024-10-02 | End: 2024-10-31

## 2024-10-02 RX ORDER — OLANZAPINE 2.5 MG
3.75 TABLET ORAL
Qty: 45 | Refills: 0
Start: 2024-10-02 | End: 2024-10-31

## 2024-10-02 RX ORDER — DONEPEZIL HCL 10 MG
1 TABLET ORAL
Qty: 30 | Refills: 0
Start: 2024-10-02 | End: 2024-10-31

## 2024-10-02 RX ADMIN — Medication 17 GRAM(S): at 09:02

## 2024-10-02 RX ADMIN — MEMANTINE 10 MILLIGRAM(S): 10 TABLET ORAL at 09:02

## 2024-10-02 RX ADMIN — Medication 15 MILLIGRAM(S): at 09:01

## 2024-10-02 RX ADMIN — Medication 10 MILLIGRAM(S): at 09:02

## 2024-10-02 NOTE — BH INPATIENT PSYCHIATRY PROGRESS NOTE - CURRENT MEDICATION
MEDICATIONS  (STANDING):  donepezil 10 milliGRAM(s) Oral daily  escitalopram 15 milliGRAM(s) Oral daily  influenza  Vaccine (HIGH DOSE) 0.5 milliLiter(s) IntraMuscular once  memantine 10 milliGRAM(s) Oral every 12 hours  OLANZapine 3.75 milliGRAM(s) Oral at bedtime    MEDICATIONS  (PRN):  OLANZapine Injectable 2.5 milliGRAM(s) IntraMuscular once PRN agitation  polyethylene glycol 3350 17 Gram(s) Oral at bedtime PRN Constipation  QUEtiapine 25 milliGRAM(s) Oral every 6 hours PRN Agitation

## 2024-10-02 NOTE — BH INPATIENT PSYCHIATRY PROGRESS NOTE - NSICDXBHPRIMARYDX_PSY_ALL_CORE
Dementia with behavioral disturbance   F03.919  
Dementia with behavioral disturbance   F03.916  
Dementia with behavioral disturbance   F03.919  
Dementia with behavioral disturbance   F03.912  
Dementia with behavioral disturbance   F03.915  
Dementia with behavioral disturbance   F03.914  
Dementia with behavioral disturbance   F03.919  
Dementia with behavioral disturbance   F03.919  
Dementia with behavioral disturbance   F03.910  
Dementia with behavioral disturbance   F03.911  
Dementia with behavioral disturbance   F03.912  
Dementia with behavioral disturbance   F03.919  
Dementia with behavioral disturbance   F03.912  
Dementia with behavioral disturbance   F03.916  
Dementia with behavioral disturbance   F03.913  
Dementia with behavioral disturbance   F03.914

## 2024-10-02 NOTE — BH INPATIENT PSYCHIATRY PROGRESS NOTE - NSTXCONFGOAL_PSY_ALL_CORE
Other...
Will ask for assistance as required
Other...
Will ask for assistance as required
Other...
Will ask for assistance as required
Other...
Will ask for assistance as required
Other...

## 2024-10-02 NOTE — BH INPATIENT PSYCHIATRY PROGRESS NOTE - NSBHATTESTTYPEVISIT_PSY_A_CORE
Attending with Resident/Fellow/Student
Attending with Resident/Fellow/Student
Attending Only
Attending with Resident/Fellow/Student

## 2024-10-02 NOTE — BH INPATIENT PSYCHIATRY PROGRESS NOTE - NSBHCHARTREVIEWVS_PSY_A_CORE FT
Vital Signs Last 24 Hrs  T(C): 36.3 (10-02-24 @ 08:05), Max: 36.3 (10-02-24 @ 08:05)  T(F): 97.3 (10-02-24 @ 08:05), Max: 97.3 (10-02-24 @ 08:05)  HR: --  BP: --  BP(mean): --  RR: 16 (10-02-24 @ 08:05) (16 - 16)  SpO2: --    Orthostatic VS  10-02-24 @ 08:05  Lying BP: --/-- HR: --  Sitting BP: 123/69 HR: 73  Standing BP: 112/62 HR: 79  Site: --  Mode: --  Orthostatic VS  10-01-24 @ 05:51  Lying BP: --/-- HR: --  Sitting BP: 141/78 HR: 67  Standing BP: 124/67 HR: 71  Site: upper right arm  Mode: electronic

## 2024-10-02 NOTE — BH INPATIENT PSYCHIATRY PROGRESS NOTE - NSTXIMPULSGOAL_PSY_ALL_CORE
Will be able to demonstrate the ability to pause before acting out negatively
Will identify 1 behavior to cope with impulsive urges
Will be able to demonstrate the ability to pause before acting out negatively
Will identify 1 behavior to cope with impulsive urges
Will be able to demonstrate the ability to pause before acting out negatively
Will identify 1 behavior to cope with impulsive urges
Will identify 1 behavior to cope with impulsive urges
Will be able to demonstrate the ability to pause before acting out negatively
Will identify 1 behavior to cope with impulsive urges
Will be able to demonstrate the ability to pause before acting out negatively
Will identify 1 behavior to cope with impulsive urges

## 2024-10-02 NOTE — BH INPATIENT PSYCHIATRY PROGRESS NOTE - NSTXPROBANX_PSY_ALL_CORE
ANXIETY/PANIC/FEAR

## 2024-10-02 NOTE — BH INPATIENT PSYCHIATRY PROGRESS NOTE - PRN MEDS
MEDICATIONS  (PRN):  OLANZapine Injectable 2.5 milliGRAM(s) IntraMuscular once PRN agitation  polyethylene glycol 3350 17 Gram(s) Oral at bedtime PRN Constipation  QUEtiapine 25 milliGRAM(s) Oral every 6 hours PRN Agitation

## 2024-10-02 NOTE — BH INPATIENT PSYCHIATRY PROGRESS NOTE - NSTXCONFDATEEST_PSY_ALL_CORE
24-Sep-2024
17-Sep-2024
17-Sep-2024
02-Oct-2024
24-Sep-2024
17-Sep-2024
24-Sep-2024
17-Sep-2024
24-Sep-2024
01-Oct-2024
17-Sep-2024
24-Sep-2024
24-Sep-2024

## 2024-10-02 NOTE — BH INPATIENT PSYCHIATRY PROGRESS NOTE - NSBHCONSDANGERSELF_PSY_A_CORE
suicidal behavior/unable to care for self

## 2024-10-02 NOTE — BH INPATIENT PSYCHIATRY PROGRESS NOTE - NSTXPROBCONF_PSY_ALL_CORE
CONFUSION, ACUTE/CHRONIC

## 2024-10-02 NOTE — BH INPATIENT PSYCHIATRY PROGRESS NOTE - NSTXCONFDATETRGT_PSY_ALL_CORE
24-Sep-2024
01-Oct-2024
24-Sep-2024
24-Sep-2024
01-Oct-2024
24-Sep-2024
08-Oct-2024
01-Oct-2024
02-Oct-2024
24-Sep-2024
01-Oct-2024

## 2024-10-02 NOTE — BH INPATIENT PSYCHIATRY PROGRESS NOTE - MSE UNSTRUCTURED FT
The patient appears stated age, appropriately dressed and fairly groomed.  Minimal psychomotor retardation. Gait is steady.  Her speech is coherent, hesitant sometimes, often irrelevant though coherent. Mood is Anxious with a congruent and constricted Affect. Denied any Auditory or Visual Hallucinations. No spontaneous statements about suicidal thoughts. No delusions elicited.  Her judgement and insight are poor. She is disoriented to time and place, and minimally oriented to person.
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
## 2024-10-02 NOTE — BH INPATIENT PSYCHIATRY PROGRESS NOTE - NSBHFUPINTERVALCCFT_PSY_A_CORE
"They have all left me"
confusion
"I wish I could say I am happy, but I am not"
I am not sleeping well
"They have all left me"
"I am not sleeping well"
I am afraid
Confusion, agitation
I want to get better
anxiety
"My parents left me"
"I am not with my parents, and they don't want me. They told me the night they left me here"
"I am not sleeping well"
Patient in PM reported chest pressure
"I thought where I was going was a better place"
"I am not sleeping well"

## 2024-10-02 NOTE — DIETITIAN INITIAL EVALUATION ADULT - PERTINENT LABORATORY DATA
Glucose: 118 mg/dL (09.27.24 @ 13:50)    Hemoglobin: 11.9 g/dL (09.27.24 @ 13:50)   Hematocrit: 36.8 % (09.27.24 @ 13:50)   Mean Cell Volume: 89.8 fL (09.27.24 @ 13:50)   Mean Cell Hemoglobin: 29.0 pg (09.27.24 @ 13:50)     Vitamin B12, Serum: 510 pg/mL (09.25.24 @ 16:17)    Folate, Serum: 12.0 ng/mL (09.25.24 @ 16:17)

## 2024-10-02 NOTE — BH INPATIENT PSYCHIATRY PROGRESS NOTE - NSTXCONFGOALOTHER_PSY_ALL_CORE
Patient with moderate to maximum encouragement will participate in two rehab groups daily within seven days.
Patient with moderate to maximum encouragement will participate in two rehab groups daily within seven days.
Patient with moderate to maximum prompting will particpate in two rehab groups daily within seven days.
Patient with moderate to maximum prompting will particpate in two rehab groups daily within seven days.
Patient with moderate to maximum encouragement will participate in two rehab groups daily within seven days.
Patient with moderate to maximum prompting will particpate in two rehab groups daily within seven days.
Patient with moderate to maximum encouragement will participate in two rehab groups daily within seven days.
Patient is scheduled to be dishcarged today and will return to her home.
Patient with moderate to maximum encouragement will participate in two rehab groups daily within seven days.
Patient with moderate to maximum encouragement will participate in two rehab groups daily within seven days.
Patient with moderate to maximum prompting will particpate in two rehab groups daily within seven days.

## 2024-10-02 NOTE — BH INPATIENT PSYCHIATRY PROGRESS NOTE - NSTXDEPRESGOAL_PSY_ALL_CORE
Will identify 2 coping skills that assist in improving mood
Exhibit improvements in self-grooming, hygiene, sleep and appetite
Will identify 2 coping skills that assist in improving mood
Exhibit improvements in self-grooming, hygiene, sleep and appetite
Will identify 2 coping skills that assist in improving mood
Exhibit improvements in self-grooming, hygiene, sleep and appetite
Will identify 2 coping skills that assist in improving mood
Exhibit improvements in self-grooming, hygiene, sleep and appetite
Will identify 2 coping skills that assist in improving mood
Will identify 2 coping skills that assist in improving mood
Exhibit improvements in self-grooming, hygiene, sleep and appetite

## 2024-10-02 NOTE — BH INPATIENT PSYCHIATRY PROGRESS NOTE - NSTXCONFPROGRES_PSY_ALL_CORE
No Change
Improving
No Change
Improving
No Change
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
## 2024-10-02 NOTE — BH INPATIENT PSYCHIATRY PROGRESS NOTE - NSDCCRITERIA_PSY_ALL_CORE
Controlled behavior, euthymic mood.

## 2024-10-02 NOTE — BH INPATIENT PSYCHIATRY PROGRESS NOTE - NSBHMETABOLIC_PSY_ALL_CORE_FT
BMI:   HbA1c:   Glucose:   BP: --Vital Signs Last 24 Hrs  T(C): 36.3 (10-02-24 @ 08:05), Max: 36.3 (10-02-24 @ 08:05)  T(F): 97.3 (10-02-24 @ 08:05), Max: 97.3 (10-02-24 @ 08:05)  HR: --  BP: --  BP(mean): --  RR: 16 (10-02-24 @ 08:05) (16 - 16)  SpO2: --    Orthostatic VS  10-02-24 @ 08:05  Lying BP: --/-- HR: --  Sitting BP: 123/69 HR: 73  Standing BP: 112/62 HR: 79  Site: --  Mode: --  Orthostatic VS  10-01-24 @ 05:51  Lying BP: --/-- HR: --  Sitting BP: 141/78 HR: 67  Standing BP: 124/67 HR: 71  Site: upper right arm  Mode: electronic    Lipid Panel:

## 2024-10-02 NOTE — BH INPATIENT PSYCHIATRY PROGRESS NOTE - NSICDXBHSECONDARYDX_PSY_ALL_CORE
Depression, major, single episode, moderate   F32.1  

## 2024-10-02 NOTE — BH INPATIENT PSYCHIATRY PROGRESS NOTE - NSBHATTESTBILLING_PSY_A_CORE
98276-Zprhysamhh OBS or IP - moderate complexity OR 35-49 mins
01537-Zqekdhytbb OBS or IP - moderate complexity OR 35-49 mins
33084-Qoqbwypblj OBS or IP - moderate complexity OR 35-49 mins
28025-Tboukjnddk OBS or IP - moderate complexity OR 35-49 mins
82679-Loihvfarvi OBS or IP - moderate complexity OR 35-49 mins
63084-Ggrcwcfihh OBS or IP - moderate complexity OR 35-49 mins
64478-Eseluajcoe OBS or IP - moderate complexity OR 35-49 mins
65664-Ohmuoqcrul OBS or IP - moderate complexity OR 35-49 mins
60215-Opynxvpeza OBS or IP - moderate complexity OR 35-49 mins
40033-Ampfqrflnb OBS or IP - moderate complexity OR 35-49 mins
61466-Surorwmmjl OBS or IP - moderate complexity OR 35-49 mins
71510-Eldkwnerym OBS or IP - moderate complexity OR 35-49 mins
73226-Qjmppbsiez OBS or IP - moderate complexity OR 35-49 mins
12913-Xbfgfedeuv OBS or IP - moderate complexity OR 35-49 mins
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
## 2024-10-02 NOTE — BH INPATIENT PSYCHIATRY PROGRESS NOTE - NSTXDCOTHRDATETRGT_PSY_ALL_CORE
23-Sep-2024
30-Sep-2024
30-Sep-2024
07-Oct-2024
30-Sep-2024
07-Oct-2024
30-Sep-2024
23-Sep-2024
30-Sep-2024
23-Sep-2024
30-Sep-2024

## 2024-10-02 NOTE — BH INPATIENT PSYCHIATRY PROGRESS NOTE - NSTXANXGOAL_PSY_ALL_CORE
Be able to perform ADLs and maintain safety despite anxiety/panic daily

## 2024-10-02 NOTE — BH INPATIENT PSYCHIATRY PROGRESS NOTE - NSTXDCOTHRDATEEST_PSY_ALL_CORE
23-Sep-2024
16-Sep-2024
23-Sep-2024
16-Sep-2024
23-Sep-2024
23-Sep-2024
30-Sep-2024
16-Sep-2024
16-Sep-2024
23-Sep-2024
30-Sep-2024
23-Sep-2024
16-Sep-2024
16-Sep-2024
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
## 2025-01-13 NOTE — ED ADULT NURSE NOTE - NSSEPSISSUSPECTED_ED_A_ED
PACU Transfer to [facility]    Vitals:    01/13/25 0839   BP: 124/66   Pulse: 68   Resp: 17   Temp: 97.5 °F (36.4 °C)   SpO2: 99%         Intake/Output Summary (Last 24 hours) at 1/13/2025 0844  Last data filed at 1/13/2025 0844  Gross per 24 hour   Intake 875 ml   Output 25 ml   Net 850 ml       Pain assessment:    Pain Level: 0    Patient transferred to care of [facility] RN.    1/13/2025 8:44 AM     No